# Patient Record
Sex: FEMALE | Race: WHITE | NOT HISPANIC OR LATINO | Employment: OTHER | URBAN - METROPOLITAN AREA
[De-identification: names, ages, dates, MRNs, and addresses within clinical notes are randomized per-mention and may not be internally consistent; named-entity substitution may affect disease eponyms.]

---

## 2017-02-02 ENCOUNTER — OFFICE VISIT - HEALTHEAST (OUTPATIENT)
Dept: ONCOLOGY | Facility: HOSPITAL | Age: 62
End: 2017-02-02

## 2017-02-02 ENCOUNTER — AMBULATORY - HEALTHEAST (OUTPATIENT)
Dept: INFUSION THERAPY | Facility: HOSPITAL | Age: 62
End: 2017-02-02

## 2017-02-02 ENCOUNTER — COMMUNICATION - HEALTHEAST (OUTPATIENT)
Dept: ONCOLOGY | Facility: HOSPITAL | Age: 62
End: 2017-02-02

## 2017-02-02 DIAGNOSIS — C50.411 MALIGNANT NEOPLASM OF UPPER-OUTER QUADRANT OF RIGHT FEMALE BREAST (H): ICD-10-CM

## 2017-02-02 DIAGNOSIS — M85.80 OSTEOPENIA: ICD-10-CM

## 2017-02-02 DIAGNOSIS — M25.50 ARTHRALGIA: ICD-10-CM

## 2017-02-02 DIAGNOSIS — R45.86 EMOTIONAL LABILITY: ICD-10-CM

## 2017-02-02 DIAGNOSIS — R53.83 FATIGUE: ICD-10-CM

## 2017-03-28 ENCOUNTER — COMMUNICATION - HEALTHEAST (OUTPATIENT)
Dept: ONCOLOGY | Facility: HOSPITAL | Age: 62
End: 2017-03-28

## 2017-05-25 ENCOUNTER — OFFICE VISIT - HEALTHEAST (OUTPATIENT)
Dept: ONCOLOGY | Facility: HOSPITAL | Age: 62
End: 2017-05-25

## 2017-05-25 ENCOUNTER — AMBULATORY - HEALTHEAST (OUTPATIENT)
Dept: INFUSION THERAPY | Facility: HOSPITAL | Age: 62
End: 2017-05-25

## 2017-05-25 DIAGNOSIS — C50.411 MALIGNANT NEOPLASM OF UPPER-OUTER QUADRANT OF RIGHT FEMALE BREAST (H): ICD-10-CM

## 2017-05-25 DIAGNOSIS — M85.80 OSTEOPENIA: ICD-10-CM

## 2017-06-29 ENCOUNTER — HOSPITAL ENCOUNTER (OUTPATIENT)
Dept: MAMMOGRAPHY | Facility: HOSPITAL | Age: 62
Discharge: HOME | End: 2017-06-29
Attending: OBSTETRICS & GYNECOLOGY

## 2017-06-29 DIAGNOSIS — Z12.31 VISIT FOR SCREENING MAMMOGRAM: ICD-10-CM

## 2017-09-29 ENCOUNTER — COMMUNICATION - HEALTHEAST (OUTPATIENT)
Dept: ONCOLOGY | Facility: HOSPITAL | Age: 62
End: 2017-09-29

## 2017-10-12 ENCOUNTER — COMMUNICATION - HEALTHEAST (OUTPATIENT)
Dept: ONCOLOGY | Facility: HOSPITAL | Age: 62
End: 2017-10-12

## 2017-11-27 ENCOUNTER — OFFICE VISIT - HEALTHEAST (OUTPATIENT)
Dept: ONCOLOGY | Facility: HOSPITAL | Age: 62
End: 2017-11-27

## 2017-11-27 ENCOUNTER — AMBULATORY - HEALTHEAST (OUTPATIENT)
Dept: INFUSION THERAPY | Facility: HOSPITAL | Age: 62
End: 2017-11-27

## 2017-11-27 DIAGNOSIS — Z17.0 MALIGNANT NEOPLASM OF UPPER-OUTER QUADRANT OF RIGHT BREAST IN FEMALE, ESTROGEN RECEPTOR POSITIVE (H): ICD-10-CM

## 2017-11-27 DIAGNOSIS — M85.80 OSTEOPENIA: ICD-10-CM

## 2017-11-27 DIAGNOSIS — M25.50 ARTHRALGIA: ICD-10-CM

## 2017-11-27 DIAGNOSIS — C50.411 MALIGNANT NEOPLASM OF UPPER-OUTER QUADRANT OF RIGHT BREAST IN FEMALE, ESTROGEN RECEPTOR POSITIVE (H): ICD-10-CM

## 2017-11-27 DIAGNOSIS — C50.411 MALIGNANT NEOPLASM OF UPPER-OUTER QUADRANT OF RIGHT FEMALE BREAST (H): ICD-10-CM

## 2018-02-22 ENCOUNTER — RECORDS - HEALTHEAST (OUTPATIENT)
Dept: BONE DENSITY | Facility: CLINIC | Age: 63
End: 2018-02-22

## 2018-02-22 DIAGNOSIS — M85.80 OTHER SPECIFIED DISORDERS OF BONE DENSITY AND STRUCTURE, UNSPECIFIED SITE: ICD-10-CM

## 2018-02-26 ENCOUNTER — RECORDS - HEALTHEAST (OUTPATIENT)
Dept: ADMINISTRATIVE | Facility: OTHER | Age: 63
End: 2018-02-26

## 2018-03-09 ENCOUNTER — COMMUNICATION - HEALTHEAST (OUTPATIENT)
Dept: ONCOLOGY | Facility: HOSPITAL | Age: 63
End: 2018-03-09

## 2018-05-17 ENCOUNTER — OFFICE VISIT - HEALTHEAST (OUTPATIENT)
Dept: ONCOLOGY | Facility: HOSPITAL | Age: 63
End: 2018-05-17

## 2018-05-17 ENCOUNTER — AMBULATORY - HEALTHEAST (OUTPATIENT)
Dept: INFUSION THERAPY | Facility: HOSPITAL | Age: 63
End: 2018-05-17

## 2018-05-17 DIAGNOSIS — C50.411 MALIGNANT NEOPLASM OF UPPER-OUTER QUADRANT OF RIGHT BREAST IN FEMALE, ESTROGEN RECEPTOR POSITIVE (H): ICD-10-CM

## 2018-05-17 DIAGNOSIS — Z17.0 MALIGNANT NEOPLASM OF UPPER-OUTER QUADRANT OF RIGHT BREAST IN FEMALE, ESTROGEN RECEPTOR POSITIVE (H): ICD-10-CM

## 2018-05-17 LAB
ALBUMIN SERPL-MCNC: 3.6 G/DL (ref 3.5–5)
ALP SERPL-CCNC: 66 U/L (ref 45–120)
ALT SERPL W P-5'-P-CCNC: 22 U/L (ref 0–45)
ANION GAP SERPL CALCULATED.3IONS-SCNC: 8 MMOL/L (ref 5–18)
AST SERPL W P-5'-P-CCNC: 19 U/L (ref 0–40)
BASOPHILS # BLD AUTO: 0 THOU/UL (ref 0–0.2)
BASOPHILS NFR BLD AUTO: 0 % (ref 0–2)
BILIRUB SERPL-MCNC: 0.6 MG/DL (ref 0–1)
BUN SERPL-MCNC: 23 MG/DL (ref 8–22)
CALCIUM SERPL-MCNC: 10 MG/DL (ref 8.5–10.5)
CHLORIDE BLD-SCNC: 101 MMOL/L (ref 98–107)
CO2 SERPL-SCNC: 28 MMOL/L (ref 22–31)
CREAT SERPL-MCNC: 0.86 MG/DL (ref 0.6–1.1)
EOSINOPHIL # BLD AUTO: 0.2 THOU/UL (ref 0–0.4)
EOSINOPHIL NFR BLD AUTO: 3 % (ref 0–6)
ERYTHROCYTE [DISTWIDTH] IN BLOOD BY AUTOMATED COUNT: 13 % (ref 11–14.5)
GFR SERPL CREATININE-BSD FRML MDRD: >60 ML/MIN/1.73M2
GLUCOSE BLD-MCNC: 113 MG/DL (ref 70–125)
HCT VFR BLD AUTO: 38.3 % (ref 35–47)
HGB BLD-MCNC: 12.7 G/DL (ref 12–16)
LYMPHOCYTES # BLD AUTO: 1.6 THOU/UL (ref 0.8–4.4)
LYMPHOCYTES NFR BLD AUTO: 26 % (ref 20–40)
MCH RBC QN AUTO: 29.6 PG (ref 27–34)
MCHC RBC AUTO-ENTMCNC: 33.2 G/DL (ref 32–36)
MCV RBC AUTO: 89 FL (ref 80–100)
MONOCYTES # BLD AUTO: 0.6 THOU/UL (ref 0–0.9)
MONOCYTES NFR BLD AUTO: 9 % (ref 2–10)
NEUTROPHILS # BLD AUTO: 3.7 THOU/UL (ref 2–7.7)
NEUTROPHILS NFR BLD AUTO: 62 % (ref 50–70)
PLATELET # BLD AUTO: 228 THOU/UL (ref 140–440)
PMV BLD AUTO: 12.3 FL (ref 8.5–12.5)
POTASSIUM BLD-SCNC: 4.2 MMOL/L (ref 3.5–5)
PROT SERPL-MCNC: 7.1 G/DL (ref 6–8)
RBC # BLD AUTO: 4.29 MILL/UL (ref 3.8–5.4)
SODIUM SERPL-SCNC: 137 MMOL/L (ref 136–145)
WBC: 6 THOU/UL (ref 4–11)

## 2018-06-30 ENCOUNTER — RECORDS - HEALTHEAST (OUTPATIENT)
Dept: ADMINISTRATIVE | Facility: OTHER | Age: 63
End: 2018-06-30

## 2018-08-01 ENCOUNTER — COMMUNICATION - HEALTHEAST (OUTPATIENT)
Dept: ONCOLOGY | Facility: CLINIC | Age: 63
End: 2018-08-01

## 2018-08-01 ENCOUNTER — HOSPITAL ENCOUNTER (OUTPATIENT)
Dept: MAMMOGRAPHY | Facility: CLINIC | Age: 63
Discharge: HOME | End: 2018-08-01
Attending: INTERNAL MEDICINE

## 2018-08-01 DIAGNOSIS — Z12.31 VISIT FOR SCREENING MAMMOGRAM: ICD-10-CM

## 2018-08-30 ENCOUNTER — RECORDS - HEALTHEAST (OUTPATIENT)
Dept: LAB | Facility: CLINIC | Age: 63
End: 2018-08-30

## 2018-08-30 LAB
CHOLEST SERPL-MCNC: 279 MG/DL
FASTING STATUS PATIENT QL REPORTED: YES
HDLC SERPL-MCNC: 83 MG/DL
LDLC SERPL CALC-MCNC: 183 MG/DL
TRIGL SERPL-MCNC: 64 MG/DL
VIT B12 SERPL-MCNC: 898 PG/ML (ref 213–816)

## 2018-11-09 ENCOUNTER — COMMUNICATION - HEALTHEAST (OUTPATIENT)
Dept: ONCOLOGY | Facility: HOSPITAL | Age: 63
End: 2018-11-09

## 2018-11-15 ENCOUNTER — OFFICE VISIT - HEALTHEAST (OUTPATIENT)
Dept: ONCOLOGY | Facility: HOSPITAL | Age: 63
End: 2018-11-15

## 2018-11-15 ENCOUNTER — AMBULATORY - HEALTHEAST (OUTPATIENT)
Dept: INFUSION THERAPY | Facility: HOSPITAL | Age: 63
End: 2018-11-15

## 2018-11-15 DIAGNOSIS — C50.411 MALIGNANT NEOPLASM OF UPPER-OUTER QUADRANT OF RIGHT BREAST IN FEMALE, ESTROGEN RECEPTOR POSITIVE (H): ICD-10-CM

## 2018-11-15 DIAGNOSIS — Z17.0 MALIGNANT NEOPLASM OF UPPER-OUTER QUADRANT OF RIGHT BREAST IN FEMALE, ESTROGEN RECEPTOR POSITIVE (H): ICD-10-CM

## 2018-11-15 LAB
ALBUMIN SERPL-MCNC: 3.7 G/DL (ref 3.5–5)
ALP SERPL-CCNC: 70 U/L (ref 45–120)
ALT SERPL W P-5'-P-CCNC: 27 U/L (ref 0–45)
ANION GAP SERPL CALCULATED.3IONS-SCNC: 6 MMOL/L (ref 5–18)
AST SERPL W P-5'-P-CCNC: 20 U/L (ref 0–40)
BASOPHILS # BLD AUTO: 0 THOU/UL (ref 0–0.2)
BASOPHILS NFR BLD AUTO: 0 % (ref 0–2)
BILIRUB SERPL-MCNC: 0.4 MG/DL (ref 0–1)
BUN SERPL-MCNC: 20 MG/DL (ref 8–22)
CALCIUM SERPL-MCNC: 9.9 MG/DL (ref 8.5–10.5)
CHLORIDE BLD-SCNC: 103 MMOL/L (ref 98–107)
CO2 SERPL-SCNC: 30 MMOL/L (ref 22–31)
CREAT SERPL-MCNC: 0.8 MG/DL (ref 0.6–1.1)
EOSINOPHIL # BLD AUTO: 0.2 THOU/UL (ref 0–0.4)
EOSINOPHIL NFR BLD AUTO: 3 % (ref 0–6)
ERYTHROCYTE [DISTWIDTH] IN BLOOD BY AUTOMATED COUNT: 13.2 % (ref 11–14.5)
GFR SERPL CREATININE-BSD FRML MDRD: >60 ML/MIN/1.73M2
GLUCOSE BLD-MCNC: 91 MG/DL (ref 70–125)
HCT VFR BLD AUTO: 38.4 % (ref 35–47)
HGB BLD-MCNC: 12.8 G/DL (ref 12–16)
LYMPHOCYTES # BLD AUTO: 1.5 THOU/UL (ref 0.8–4.4)
LYMPHOCYTES NFR BLD AUTO: 22 % (ref 20–40)
MCH RBC QN AUTO: 30.3 PG (ref 27–34)
MCHC RBC AUTO-ENTMCNC: 33.3 G/DL (ref 32–36)
MCV RBC AUTO: 91 FL (ref 80–100)
MONOCYTES # BLD AUTO: 0.6 THOU/UL (ref 0–0.9)
MONOCYTES NFR BLD AUTO: 9 % (ref 2–10)
NEUTROPHILS # BLD AUTO: 4.6 THOU/UL (ref 2–7.7)
NEUTROPHILS NFR BLD AUTO: 66 % (ref 50–70)
PLATELET # BLD AUTO: 234 THOU/UL (ref 140–440)
PMV BLD AUTO: 12.1 FL (ref 8.5–12.5)
POTASSIUM BLD-SCNC: 4.4 MMOL/L (ref 3.5–5)
PROT SERPL-MCNC: 6.9 G/DL (ref 6–8)
RBC # BLD AUTO: 4.23 MILL/UL (ref 3.8–5.4)
SODIUM SERPL-SCNC: 139 MMOL/L (ref 136–145)
WBC: 7 THOU/UL (ref 4–11)

## 2018-12-12 ENCOUNTER — COMMUNICATION - HEALTHEAST (OUTPATIENT)
Dept: ONCOLOGY | Facility: HOSPITAL | Age: 63
End: 2018-12-12

## 2019-05-16 ENCOUNTER — OFFICE VISIT - HEALTHEAST (OUTPATIENT)
Dept: ONCOLOGY | Facility: HOSPITAL | Age: 64
End: 2019-05-16

## 2019-05-16 ENCOUNTER — AMBULATORY - HEALTHEAST (OUTPATIENT)
Dept: INFUSION THERAPY | Facility: HOSPITAL | Age: 64
End: 2019-05-16

## 2019-05-16 DIAGNOSIS — C50.411 MALIGNANT NEOPLASM OF UPPER-OUTER QUADRANT OF RIGHT BREAST IN FEMALE, ESTROGEN RECEPTOR POSITIVE (H): ICD-10-CM

## 2019-05-16 DIAGNOSIS — Z17.0 MALIGNANT NEOPLASM OF UPPER-OUTER QUADRANT OF RIGHT BREAST IN FEMALE, ESTROGEN RECEPTOR POSITIVE (H): ICD-10-CM

## 2019-05-16 LAB
ALBUMIN SERPL-MCNC: 3.9 G/DL (ref 3.5–5)
ALP SERPL-CCNC: 76 U/L (ref 45–120)
ALT SERPL W P-5'-P-CCNC: 19 U/L (ref 0–45)
ANION GAP SERPL CALCULATED.3IONS-SCNC: 7 MMOL/L (ref 5–18)
AST SERPL W P-5'-P-CCNC: 19 U/L (ref 0–40)
BASOPHILS # BLD AUTO: 0 THOU/UL (ref 0–0.2)
BASOPHILS NFR BLD AUTO: 0 % (ref 0–2)
BILIRUB SERPL-MCNC: 0.4 MG/DL (ref 0–1)
BUN SERPL-MCNC: 21 MG/DL (ref 8–22)
CALCIUM SERPL-MCNC: 10.1 MG/DL (ref 8.5–10.5)
CHLORIDE BLD-SCNC: 102 MMOL/L (ref 98–107)
CO2 SERPL-SCNC: 28 MMOL/L (ref 22–31)
CREAT SERPL-MCNC: 0.93 MG/DL (ref 0.6–1.1)
EOSINOPHIL # BLD AUTO: 0.1 THOU/UL (ref 0–0.4)
EOSINOPHIL NFR BLD AUTO: 2 % (ref 0–6)
ERYTHROCYTE [DISTWIDTH] IN BLOOD BY AUTOMATED COUNT: 13 % (ref 11–14.5)
GFR SERPL CREATININE-BSD FRML MDRD: >60 ML/MIN/1.73M2
GLUCOSE BLD-MCNC: 95 MG/DL (ref 70–125)
HCT VFR BLD AUTO: 39.8 % (ref 35–47)
HGB BLD-MCNC: 13.3 G/DL (ref 12–16)
LYMPHOCYTES # BLD AUTO: 1.2 THOU/UL (ref 0.8–4.4)
LYMPHOCYTES NFR BLD AUTO: 18 % (ref 20–40)
MCH RBC QN AUTO: 30.2 PG (ref 27–34)
MCHC RBC AUTO-ENTMCNC: 33.4 G/DL (ref 32–36)
MCV RBC AUTO: 90 FL (ref 80–100)
MONOCYTES # BLD AUTO: 0.6 THOU/UL (ref 0–0.9)
MONOCYTES NFR BLD AUTO: 9 % (ref 2–10)
NEUTROPHILS # BLD AUTO: 4.9 THOU/UL (ref 2–7.7)
NEUTROPHILS NFR BLD AUTO: 71 % (ref 50–70)
PLATELET # BLD AUTO: 234 THOU/UL (ref 140–440)
PMV BLD AUTO: 11.9 FL (ref 8.5–12.5)
POTASSIUM BLD-SCNC: 4.2 MMOL/L (ref 3.5–5)
PROT SERPL-MCNC: 7.3 G/DL (ref 6–8)
RBC # BLD AUTO: 4.41 MILL/UL (ref 3.8–5.4)
SODIUM SERPL-SCNC: 137 MMOL/L (ref 136–145)
WBC: 6.9 THOU/UL (ref 4–11)

## 2019-05-22 ENCOUNTER — RECORDS - HEALTHEAST (OUTPATIENT)
Dept: ADMINISTRATIVE | Facility: OTHER | Age: 64
End: 2019-05-22

## 2019-05-23 ENCOUNTER — RECORDS - HEALTHEAST (OUTPATIENT)
Dept: ADMINISTRATIVE | Facility: OTHER | Age: 64
End: 2019-05-23

## 2019-05-28 ENCOUNTER — RECORDS - HEALTHEAST (OUTPATIENT)
Dept: ADMINISTRATIVE | Facility: OTHER | Age: 64
End: 2019-05-28

## 2019-05-29 ENCOUNTER — HOSPITAL ENCOUNTER (OUTPATIENT)
Dept: CARDIOLOGY | Facility: HOSPITAL | Age: 64
Discharge: HOME | End: 2019-05-29
Attending: FAMILY MEDICINE

## 2019-05-29 DIAGNOSIS — R07.9 CHEST PAIN, UNSPECIFIED TYPE: ICD-10-CM

## 2019-05-29 LAB
CV STRESS CURRENT BP HE: NORMAL
CV STRESS CURRENT HR HE: 112
CV STRESS CURRENT HR HE: 115
CV STRESS CURRENT HR HE: 122
CV STRESS CURRENT HR HE: 123
CV STRESS CURRENT HR HE: 133
CV STRESS CURRENT HR HE: 134
CV STRESS CURRENT HR HE: 143
CV STRESS CURRENT HR HE: 146
CV STRESS CURRENT HR HE: 148
CV STRESS CURRENT HR HE: 154
CV STRESS CURRENT HR HE: 156
CV STRESS CURRENT HR HE: 165
CV STRESS CURRENT HR HE: 166
CV STRESS CURRENT HR HE: 77
CV STRESS CURRENT HR HE: 78
CV STRESS CURRENT HR HE: 79
CV STRESS CURRENT HR HE: 80
CV STRESS CURRENT HR HE: 80
CV STRESS CURRENT HR HE: 82
CV STRESS CURRENT HR HE: 84
CV STRESS CURRENT HR HE: 85
CV STRESS CURRENT HR HE: 88
CV STRESS CURRENT HR HE: 89
CV STRESS CURRENT HR HE: 89
CV STRESS DEVIATION TIME HE: NORMAL
CV STRESS ECHO PERCENT HR HE: NORMAL
CV STRESS EXERCISE STAGE HE: NORMAL
CV STRESS FINAL RESTING BP HE: NORMAL
CV STRESS FINAL RESTING HR HE: 82
CV STRESS MAX HR HE: 166
CV STRESS MAX TREADMILL GRADE HE: 12
CV STRESS MAX TREADMILL SPEED HE: 2.5
CV STRESS PEAK DIA BP HE: NORMAL
CV STRESS PEAK SYS BP HE: NORMAL
CV STRESS PHASE HE: NORMAL
CV STRESS PROTOCOL HE: NORMAL
CV STRESS RESTING PT POSITION HE: NORMAL
CV STRESS RESTING PT POSITION HE: NORMAL
CV STRESS ST DEVIATION AMOUNT HE: NORMAL
CV STRESS ST DEVIATION ELEVATION HE: NORMAL
CV STRESS ST EVELATION AMOUNT HE: NORMAL
CV STRESS TEST TYPE HE: NORMAL
CV STRESS TOTAL STAGE TIME MIN 1 HE: NORMAL
STRESS ECHO BASELINE BP: NORMAL
STRESS ECHO BASELINE HR: 85
STRESS ECHO CALCULATED PERCENT HR: 107 %
STRESS ECHO LAST STRESS BP: NORMAL
STRESS ECHO LAST STRESS HR: 165
STRESS ECHO POST ESTIMATED WORKLOAD: 7.1
STRESS ECHO POST EXERCISE DUR MIN: 5
STRESS ECHO POST EXERCISE DUR SEC: 55
STRESS ECHO TARGET HR: 132

## 2019-09-04 ENCOUNTER — COMMUNICATION - HEALTHEAST (OUTPATIENT)
Dept: ONCOLOGY | Facility: HOSPITAL | Age: 64
End: 2019-09-04

## 2019-09-04 DIAGNOSIS — C50.411 MALIGNANT NEOPLASM OF UPPER-OUTER QUADRANT OF RIGHT BREAST IN FEMALE, ESTROGEN RECEPTOR POSITIVE (H): ICD-10-CM

## 2019-09-04 DIAGNOSIS — Z17.0 MALIGNANT NEOPLASM OF UPPER-OUTER QUADRANT OF RIGHT BREAST IN FEMALE, ESTROGEN RECEPTOR POSITIVE (H): ICD-10-CM

## 2019-09-24 ENCOUNTER — HOSPITAL ENCOUNTER (OUTPATIENT)
Dept: MAMMOGRAPHY | Facility: CLINIC | Age: 64
Discharge: HOME | End: 2019-09-24
Attending: INTERNAL MEDICINE

## 2019-09-24 DIAGNOSIS — Z12.31 VISIT FOR SCREENING MAMMOGRAM: ICD-10-CM

## 2020-04-20 ENCOUNTER — COMMUNICATION - HEALTHEAST (OUTPATIENT)
Dept: ONCOLOGY | Facility: HOSPITAL | Age: 65
End: 2020-04-20

## 2020-04-20 DIAGNOSIS — C50.411 MALIGNANT NEOPLASM OF UPPER-OUTER QUADRANT OF RIGHT BREAST IN FEMALE, ESTROGEN RECEPTOR POSITIVE (H): ICD-10-CM

## 2020-04-20 DIAGNOSIS — Z17.0 MALIGNANT NEOPLASM OF UPPER-OUTER QUADRANT OF RIGHT BREAST IN FEMALE, ESTROGEN RECEPTOR POSITIVE (H): ICD-10-CM

## 2020-04-21 ENCOUNTER — COMMUNICATION - HEALTHEAST (OUTPATIENT)
Dept: ONCOLOGY | Facility: HOSPITAL | Age: 65
End: 2020-04-21

## 2020-05-11 ENCOUNTER — COMMUNICATION - HEALTHEAST (OUTPATIENT)
Dept: ONCOLOGY | Facility: HOSPITAL | Age: 65
End: 2020-05-11

## 2020-05-11 ENCOUNTER — RECORDS - HEALTHEAST (OUTPATIENT)
Dept: ADMINISTRATIVE | Facility: OTHER | Age: 65
End: 2020-05-11

## 2020-05-13 ENCOUNTER — OFFICE VISIT - HEALTHEAST (OUTPATIENT)
Dept: ONCOLOGY | Facility: HOSPITAL | Age: 65
End: 2020-05-13

## 2020-05-13 DIAGNOSIS — C50.411 MALIGNANT NEOPLASM OF UPPER-OUTER QUADRANT OF RIGHT BREAST IN FEMALE, ESTROGEN RECEPTOR POSITIVE (H): ICD-10-CM

## 2020-05-13 DIAGNOSIS — Z17.0 MALIGNANT NEOPLASM OF UPPER-OUTER QUADRANT OF RIGHT BREAST IN FEMALE, ESTROGEN RECEPTOR POSITIVE (H): ICD-10-CM

## 2020-05-13 DIAGNOSIS — M85.80 OSTEOPENIA, UNSPECIFIED LOCATION: ICD-10-CM

## 2020-07-16 ENCOUNTER — OFFICE VISIT - HEALTHEAST (OUTPATIENT)
Dept: ONCOLOGY | Facility: HOSPITAL | Age: 65
End: 2020-07-16

## 2020-07-16 DIAGNOSIS — C50.411 MALIGNANT NEOPLASM OF UPPER-OUTER QUADRANT OF RIGHT BREAST IN FEMALE, ESTROGEN RECEPTOR POSITIVE (H): ICD-10-CM

## 2020-07-16 DIAGNOSIS — Z17.0 MALIGNANT NEOPLASM OF UPPER-OUTER QUADRANT OF RIGHT BREAST IN FEMALE, ESTROGEN RECEPTOR POSITIVE (H): ICD-10-CM

## 2020-08-05 ENCOUNTER — RECORDS - HEALTHEAST (OUTPATIENT)
Dept: LAB | Facility: CLINIC | Age: 65
End: 2020-08-05

## 2020-08-05 LAB
ALBUMIN SERPL-MCNC: 4 G/DL (ref 3.5–5)
ALP SERPL-CCNC: 75 U/L (ref 45–120)
ALT SERPL W P-5'-P-CCNC: 20 U/L (ref 0–45)
ANION GAP SERPL CALCULATED.3IONS-SCNC: 11 MMOL/L (ref 5–18)
AST SERPL W P-5'-P-CCNC: 19 U/L (ref 0–40)
BILIRUB SERPL-MCNC: 0.7 MG/DL (ref 0–1)
BUN SERPL-MCNC: 16 MG/DL (ref 8–22)
CALCIUM SERPL-MCNC: 10.3 MG/DL (ref 8.5–10.5)
CHLORIDE BLD-SCNC: 103 MMOL/L (ref 98–107)
CHOLEST SERPL-MCNC: 279 MG/DL
CO2 SERPL-SCNC: 24 MMOL/L (ref 22–31)
CREAT SERPL-MCNC: 0.8 MG/DL (ref 0.6–1.1)
FASTING STATUS PATIENT QL REPORTED: YES
GFR SERPL CREATININE-BSD FRML MDRD: >60 ML/MIN/1.73M2
GLUCOSE BLD-MCNC: 95 MG/DL (ref 70–125)
HDLC SERPL-MCNC: 70 MG/DL
LDLC SERPL CALC-MCNC: 195 MG/DL
MAGNESIUM SERPL-MCNC: 1.9 MG/DL (ref 1.8–2.6)
POTASSIUM BLD-SCNC: 4.5 MMOL/L (ref 3.5–5)
PROT SERPL-MCNC: 6.9 G/DL (ref 6–8)
SODIUM SERPL-SCNC: 138 MMOL/L (ref 136–145)
TRIGL SERPL-MCNC: 70 MG/DL
VIT B12 SERPL-MCNC: 967 PG/ML (ref 213–816)

## 2020-08-06 LAB — 25(OH)D3 SERPL-MCNC: 60.9 NG/ML (ref 30–80)

## 2020-08-09 ENCOUNTER — COMMUNICATION - HEALTHEAST (OUTPATIENT)
Dept: ONCOLOGY | Facility: HOSPITAL | Age: 65
End: 2020-08-09

## 2020-08-10 ENCOUNTER — AMBULATORY - HEALTHEAST (OUTPATIENT)
Dept: ONCOLOGY | Facility: HOSPITAL | Age: 65
End: 2020-08-10

## 2020-08-10 DIAGNOSIS — C50.411 MALIGNANT NEOPLASM OF UPPER-OUTER QUADRANT OF RIGHT BREAST IN FEMALE, ESTROGEN RECEPTOR POSITIVE (H): ICD-10-CM

## 2020-08-10 DIAGNOSIS — R93.89 ABNORMAL ULTRASOUND OF NECK: ICD-10-CM

## 2020-08-10 DIAGNOSIS — Z17.0 MALIGNANT NEOPLASM OF UPPER-OUTER QUADRANT OF RIGHT BREAST IN FEMALE, ESTROGEN RECEPTOR POSITIVE (H): ICD-10-CM

## 2020-08-11 ENCOUNTER — COMMUNICATION - HEALTHEAST (OUTPATIENT)
Dept: ONCOLOGY | Facility: HOSPITAL | Age: 65
End: 2020-08-11

## 2020-10-01 ENCOUNTER — COMMUNICATION - HEALTHEAST (OUTPATIENT)
Dept: ONCOLOGY | Facility: HOSPITAL | Age: 65
End: 2020-10-01

## 2020-10-06 ENCOUNTER — HOSPITAL ENCOUNTER (OUTPATIENT)
Dept: ULTRASOUND IMAGING | Facility: HOSPITAL | Age: 65
Setting detail: RADIATION/ONCOLOGY SERIES
Discharge: STILL ADMITTED OR ADMINISTRATIVE | End: 2020-10-06
Attending: INTERNAL MEDICINE

## 2020-10-06 DIAGNOSIS — Z17.0 MALIGNANT NEOPLASM OF UPPER-OUTER QUADRANT OF RIGHT BREAST IN FEMALE, ESTROGEN RECEPTOR POSITIVE (H): ICD-10-CM

## 2020-10-06 DIAGNOSIS — R93.89 ABNORMAL ULTRASOUND OF NECK: ICD-10-CM

## 2020-10-06 DIAGNOSIS — C50.411 MALIGNANT NEOPLASM OF UPPER-OUTER QUADRANT OF RIGHT BREAST IN FEMALE, ESTROGEN RECEPTOR POSITIVE (H): ICD-10-CM

## 2020-10-08 ENCOUNTER — OFFICE VISIT - HEALTHEAST (OUTPATIENT)
Dept: ONCOLOGY | Facility: HOSPITAL | Age: 65
End: 2020-10-08

## 2020-10-08 DIAGNOSIS — C50.411 MALIGNANT NEOPLASM OF UPPER-OUTER QUADRANT OF RIGHT BREAST IN FEMALE, ESTROGEN RECEPTOR POSITIVE (H): ICD-10-CM

## 2020-10-08 DIAGNOSIS — Z17.0 MALIGNANT NEOPLASM OF UPPER-OUTER QUADRANT OF RIGHT BREAST IN FEMALE, ESTROGEN RECEPTOR POSITIVE (H): ICD-10-CM

## 2020-11-06 ENCOUNTER — HOSPITAL ENCOUNTER (OUTPATIENT)
Dept: MAMMOGRAPHY | Facility: CLINIC | Age: 65
Discharge: HOME | End: 2020-11-06
Attending: INTERNAL MEDICINE

## 2020-11-06 DIAGNOSIS — Z12.31 VISIT FOR SCREENING MAMMOGRAM: ICD-10-CM

## 2020-11-19 ENCOUNTER — RECORDS - HEALTHEAST (OUTPATIENT)
Dept: LAB | Facility: CLINIC | Age: 65
End: 2020-11-19

## 2020-11-19 LAB
ANION GAP SERPL CALCULATED.3IONS-SCNC: 7 MMOL/L (ref 5–18)
BUN SERPL-MCNC: 21 MG/DL (ref 8–22)
CALCIUM SERPL-MCNC: 9.9 MG/DL (ref 8.5–10.5)
CHLORIDE BLD-SCNC: 103 MMOL/L (ref 98–107)
CO2 SERPL-SCNC: 28 MMOL/L (ref 22–31)
CREAT SERPL-MCNC: 0.8 MG/DL (ref 0.6–1.1)
GFR SERPL CREATININE-BSD FRML MDRD: >60 ML/MIN/1.73M2
GLUCOSE BLD-MCNC: 94 MG/DL (ref 70–125)
POTASSIUM BLD-SCNC: 5.1 MMOL/L (ref 3.5–5)
SODIUM SERPL-SCNC: 138 MMOL/L (ref 136–145)
TSH SERPL DL<=0.005 MIU/L-ACNC: 1.46 UIU/ML (ref 0.3–5)

## 2021-04-17 ENCOUNTER — COMMUNICATION - HEALTHEAST (OUTPATIENT)
Dept: ONCOLOGY | Facility: HOSPITAL | Age: 66
End: 2021-04-17

## 2021-04-17 DIAGNOSIS — C50.411 MALIGNANT NEOPLASM OF UPPER-OUTER QUADRANT OF RIGHT BREAST IN FEMALE, ESTROGEN RECEPTOR POSITIVE (H): ICD-10-CM

## 2021-04-17 DIAGNOSIS — Z17.0 MALIGNANT NEOPLASM OF UPPER-OUTER QUADRANT OF RIGHT BREAST IN FEMALE, ESTROGEN RECEPTOR POSITIVE (H): ICD-10-CM

## 2021-04-19 RX ORDER — ANASTROZOLE 1 MG/1
TABLET ORAL
Qty: 90 TABLET | Refills: 0 | Status: SHIPPED | OUTPATIENT
Start: 2021-04-19 | End: 2022-06-30

## 2021-04-27 ENCOUNTER — COMMUNICATION - HEALTHEAST (OUTPATIENT)
Dept: ONCOLOGY | Facility: HOSPITAL | Age: 66
End: 2021-04-27

## 2021-04-28 ENCOUNTER — OFFICE VISIT - HEALTHEAST (OUTPATIENT)
Dept: ONCOLOGY | Facility: HOSPITAL | Age: 66
End: 2021-04-28

## 2021-04-28 DIAGNOSIS — C50.411 MALIGNANT NEOPLASM OF UPPER-OUTER QUADRANT OF RIGHT BREAST IN FEMALE, ESTROGEN RECEPTOR POSITIVE (H): ICD-10-CM

## 2021-04-28 DIAGNOSIS — Z17.0 MALIGNANT NEOPLASM OF UPPER-OUTER QUADRANT OF RIGHT BREAST IN FEMALE, ESTROGEN RECEPTOR POSITIVE (H): ICD-10-CM

## 2021-04-28 DIAGNOSIS — N60.11 MASTITIS CHRONIC, RIGHT: ICD-10-CM

## 2021-04-28 DIAGNOSIS — M85.80 OSTEOPENIA, UNSPECIFIED LOCATION: ICD-10-CM

## 2021-04-28 ASSESSMENT — MIFFLIN-ST. JEOR: SCORE: 1565.54

## 2021-05-25 ENCOUNTER — RECORDS - HEALTHEAST (OUTPATIENT)
Dept: ADMINISTRATIVE | Facility: CLINIC | Age: 66
End: 2021-05-25

## 2021-05-26 ENCOUNTER — RECORDS - HEALTHEAST (OUTPATIENT)
Dept: ADMINISTRATIVE | Facility: CLINIC | Age: 66
End: 2021-05-26

## 2021-05-27 ENCOUNTER — RECORDS - HEALTHEAST (OUTPATIENT)
Dept: ADMINISTRATIVE | Facility: CLINIC | Age: 66
End: 2021-05-27

## 2021-05-28 ENCOUNTER — RECORDS - HEALTHEAST (OUTPATIENT)
Dept: ADMINISTRATIVE | Facility: CLINIC | Age: 66
End: 2021-05-28

## 2021-05-28 NOTE — PROGRESS NOTES
[facility] Cancer Care Progress Note    Patient: Dionna Estrada  MRN: [id number]  Date of Service: 5/16/2019        Reason for visit      1. Malignant neoplasm of upper-outer quadrant of right breast in female, estrogen receptor positive (H)        Assessment     1.  Stage IIc of breast right side HER-2 positive ER positive.  Status post lumpectomy with 6 sentinel lymph nodes removed.  She has been treated with TCHP. She has had 6 cycles. Tolerated with expected side effects.  She is also finished radiation therapy and Herceptin maintenance.  Started on exemestane in February 2016.  Switched to anastrozole in March 2017. She is tolerating it well.  No evidence of recurrence.  2.  Mild osteopenia seen on Bone density stable.  3.  Tolerating the treatment well doing well overall.    Plan     1.  Continue Arimidex.  2.  Good diet and exercise.  3.  RTC in 12 months.   4.  Mammogram yearly in August.     Clinical stage      Cancer of upper-outer quadrant of female breast, right    Primary site: Breast (Right)    Staging method: AJCC 7th Edition    Clinical free text: ER+,VT-, Rzw7fdm 3+,    Clinical: Stage IIA (T2, N0, cM0) signed by Gabriel Vega MD on 7/23/2015  4:52 PM    Summary: Stage IIA (T2, N0, cM0)      History     Dionna Estrada is a very pleasant 64 y.o. old female with a history of  breast cancer located right side measuring 3.1 cm in size presenting as a lump that she detected in June 2015.  The biopsy confirmed this to be an invasive ductal carcinoma ER positive, VT negative and HER-2/abhijit 3+.  Subsequent that she underwent surgery on 9 July 2015 and she underwent right lumpectomy with sentinel lymph node biopsy.  That confirmed a 3.1 cm tumor with negative margin.  There was some DCIS present.  She had 3 sentinel lymph node removed and there were 6 total lymph nodes that were removed and all of them were negative.  Final stage T2 N0 M0 stage II.    She has started on TCHP protocol in  "August 2015 finished in December 2015. Tolerated it well. Some GERD and some diarrhea.  She also finished radiation therapy in February 2016.  Total dose of 6040 cGy delivered in 33 fractions.  Currently on Herceptin Maintenance.  Comes in today for her last treatment.     Her main complaint was that she feels very \"crabby\" being on Exemestane.  So in February 2017 her treatment was changed to anastrozole 1 mg p.o. daily.  She has been tolerating that quite well.  Her muscular skeletal symptoms are significantly better.  She is taking some supplements for that.     We have monitored her bone density and that has been stable. Comes in today for scheduled follow up.    Past Medical History     Past Medical History:   Diagnosis Date     Breast cancer (H) 2015    right      Clotting disorder (H)     lower leg     Hx antineoplastic chemotherapy      Hx of radiation therapy      Shingles        Review of Systems   Constitutional  Constitutional (WDL): All constitutional elements are within defined limits  Neurosensory  Neurosensory (WDL): All neurosensory elements are within defined limits  Cardiovascular  Cardiovascular (WDL): All cardiovascular elements are within defined limits  Pulmonary  Respiratory (WDL): Within Defined Limits  Gastrointestinal  Gastrointestinal (WDL): All gastrointestinal elements are within defined limits  Genitourinary  Genitourinary (WDL): All genitourinary elements are within defined limits  Integumentary  Integumentary (WDL): All integumentary elements are within defined limits  Patient Coping  Patient Coping: Accepting  Accompanied by  Accompanied by: Alone    ECOG performance status and Distress Assessment      ECOG Performance:    ECOG Performance Status: 0    Distress Assessment  Distress Assessment Score: No distress:     Pain Status  Currently in Pain: No/denies    Vital Signs     Vitals:    05/16/19 1526   BP: 142/87   Pulse: 76   Temp: 98  F (36.7  C)   SpO2: 98%       Physical Exam "     GENERAL: No acute distress. Cooperative in conversation.   HEENT: Alopecia. Pupils are equal, round and reactive. Oral mucosa is clean and intact. No ulcerations or mucositis noted. No bleeding noted.  RESP:Chest symmetric lungs are clear bilaterally per auscultation. Regular respiratory rate. No wheezes or rhonchi.  CV: Normal S1 S2 Regular, rate and rhythm. No murmurs.  ABD: Nondistended, soft, nontender. Positive bowel sounds. No organomegaly.   EXTREMITIES: No lower extremity edema.   NEURO: Non- focal. Alert and oriented x3.  Cranial nerves appear intact.  PSYCH: Within normal limits. No depression or anxiety.  SKIN: Warm dry intact.    LYMPH NODES: Bilateral cervical, supraclavicular, axillary lymph node examination was done.  Negative for any palpable adenopathy.    Lab Results     Results for orders placed or performed in visit on 05/16/19   Comprehensive Metabolic Panel   Result Value Ref Range    Sodium 137 136 - 145 mmol/L    Potassium 4.2 3.5 - 5.0 mmol/L    Chloride 102 98 - 107 mmol/L    CO2 28 22 - 31 mmol/L    Anion Gap, Calculation 7 5 - 18 mmol/L    Glucose 95 70 - 125 mg/dL    BUN 21 8 - 22 mg/dL    Creatinine 0.93 0.60 - 1.10 mg/dL    GFR MDRD Af Amer >60 >60 mL/min/1.73m2    GFR MDRD Non Af Amer >60 >60 mL/min/1.73m2    Bilirubin, Total 0.4 0.0 - 1.0 mg/dL    Calcium 10.1 8.5 - 10.5 mg/dL    Protein, Total 7.3 6.0 - 8.0 g/dL    Albumin 3.9 3.5 - 5.0 g/dL    Alkaline Phosphatase 76 45 - 120 U/L    AST 19 0 - 40 U/L    ALT 19 0 - 45 U/L   HM1 (CBC with Diff)   Result Value Ref Range    WBC 6.9 4.0 - 11.0 thou/uL    RBC 4.41 3.80 - 5.40 mill/uL    Hemoglobin 13.3 12.0 - 16.0 g/dL    Hematocrit 39.8 35.0 - 47.0 %    MCV 90 80 - 100 fL    MCH 30.2 27.0 - 34.0 pg    MCHC 33.4 32.0 - 36.0 g/dL    RDW 13.0 11.0 - 14.5 %    Platelets 234 140 - 440 thou/uL    MPV 11.9 8.5 - 12.5 fL    Neutrophils % 71 (H) 50 - 70 %    Lymphocytes % 18 (L) 20 - 40 %    Monocytes % 9 2 - 10 %    Eosinophils % 2 0 - 6 %     Basophils % 0 0 - 2 %    Neutrophils Absolute 4.9 2.0 - 7.7 thou/uL    Lymphocytes Absolute 1.2 0.8 - 4.4 thou/uL    Monocytes Absolute 0.6 0.0 - 0.9 thou/uL    Eosinophils Absolute 0.1 0.0 - 0.4 thou/uL    Basophils Absolute 0.0 0.0 - 0.2 thou/uL         Imaging Results     No results found.    Mammogram done in August 2018.  No evidence of any recurrence.    Gabriel Vega MD

## 2021-05-29 ENCOUNTER — RECORDS - HEALTHEAST (OUTPATIENT)
Dept: ADMINISTRATIVE | Facility: CLINIC | Age: 66
End: 2021-05-29

## 2021-05-30 ENCOUNTER — RECORDS - HEALTHEAST (OUTPATIENT)
Dept: ADMINISTRATIVE | Facility: CLINIC | Age: 66
End: 2021-05-30

## 2021-05-30 VITALS — WEIGHT: 230.1 LBS | BODY MASS INDEX: 37.14 KG/M2

## 2021-05-31 VITALS — BODY MASS INDEX: 36.93 KG/M2 | WEIGHT: 228.8 LBS

## 2021-05-31 VITALS — BODY MASS INDEX: 36.64 KG/M2 | WEIGHT: 227 LBS

## 2021-06-01 VITALS — BODY MASS INDEX: 36.85 KG/M2 | WEIGHT: 228.3 LBS

## 2021-06-02 VITALS — WEIGHT: 228.3 LBS | BODY MASS INDEX: 36.85 KG/M2

## 2021-06-03 VITALS — BODY MASS INDEX: 37.33 KG/M2 | WEIGHT: 231.3 LBS

## 2021-06-04 VITALS
WEIGHT: 223.4 LBS | DIASTOLIC BLOOD PRESSURE: 82 MMHG | SYSTOLIC BLOOD PRESSURE: 133 MMHG | OXYGEN SATURATION: 96 % | BODY MASS INDEX: 36.06 KG/M2 | HEART RATE: 75 BPM | TEMPERATURE: 98.4 F

## 2021-06-05 VITALS
WEIGHT: 223.5 LBS | HEART RATE: 72 BPM | OXYGEN SATURATION: 96 % | BODY MASS INDEX: 35.92 KG/M2 | HEIGHT: 66 IN | DIASTOLIC BLOOD PRESSURE: 74 MMHG | SYSTOLIC BLOOD PRESSURE: 154 MMHG

## 2021-06-05 VITALS
BODY MASS INDEX: 35.64 KG/M2 | HEART RATE: 87 BPM | SYSTOLIC BLOOD PRESSURE: 130 MMHG | OXYGEN SATURATION: 97 % | TEMPERATURE: 98.3 F | DIASTOLIC BLOOD PRESSURE: 81 MMHG | WEIGHT: 220.8 LBS

## 2021-06-08 NOTE — PROGRESS NOTES
[facility] Cancer Care Progress Note    Patient: Dionna Estrada  MRN: [id number]  Date of Service: 5/13/2020        Reason for visit      1. Malignant neoplasm of upper-outer quadrant of right breast in female, estrogen receptor positive (H)        Assessment     1.  Stage IIc of breast right side HER-2 positive ER positive.  Status post lumpectomy in 2015 with 6 sentinel lymph nodes removed.  She has been treated with TCHP. She has had 6 cycles. Tolerated with expected side effects.  She is also finished radiation therapy and Herceptin maintenance.  Started on exemestane in February 2016.  Switched to anastrozole in March 2017. She is tolerating it well.  No evidence of recurrence.  2.  Mild osteopenia seen on Bone density stable.  3.  Tolerating the treatment well doing well overall.    Plan     1.  Continue Arimidex.  She will continue that until February 2021.  2.  Good diet and exercise.  3.  RTC in 12 months.   4.  Mammogram yearly .  She will need a bone density next year.     Clinical stage      Cancer of upper-outer quadrant of female breast, right    Primary site: Breast (Right)    Staging method: AJCC 7th Edition    Clinical free text: ER+,MA-, Doz7yak 3+,    Clinical: Stage IIA (T2, N0, cM0) signed by Gabriel Vega MD on 7/23/2015  4:52 PM    Summary: Stage IIA (T2, N0, cM0)      History     Dionna Estrada is a very pleasant 65 y.o. old female with a history of  breast cancer located right side measuring 3.1 cm in size presenting as a lump that she detected in June 2015.  The biopsy confirmed this to be an invasive ductal carcinoma ER positive, MA negative and HER-2/abhijit 3+.  Subsequent that she underwent surgery on 9 July 2015 and she underwent right lumpectomy with sentinel lymph node biopsy.  That confirmed a 3.1 cm tumor with negative margin.  There was some DCIS present.  She had 3 sentinel lymph node removed and there were 6 total lymph nodes that were removed and all of them  "were negative.  Final stage T2 N0 M0 stage II.    She has started on TCHP protocol in August 2015 finished in December 2015. Tolerated it well. Some GERD and some diarrhea.  She also finished radiation therapy in February 2016.  Total dose of 6040 cGy delivered in 33 fractions.  Finished Herceptin Maintenance.  She was started on exemestane.    Her main complaint was that she feels very \"crabby\" being on Exemestane.  So in February 2017 her treatment was changed to anastrozole 1 mg p.o. daily.  She has been tolerating that quite well.  Her muscular skeletal symptoms are significantly better.  She is taking some supplements for that.     We have monitored her bone density and that has been stable. Comes in today for scheduled follow up.  She is retired from her main job.  Overall doing fine.  Somewhat worried about the coronavirus.    Past Medical History     Past Medical History:   Diagnosis Date     Breast cancer (H) 2015    right      Clotting disorder (H)     lower leg     Hx antineoplastic chemotherapy 2015     Hx of radiation therapy 2015     Shingles        Review of Systems   Constitutional  Constitutional (WDL): Exceptions to WDL  Weight Loss: to <10% from baseline, intervention not indicated(down 8 lbs)  Neurosensory  Neurosensory (WDL): All neurosensory elements are within defined limits  Cardiovascular  Cardiovascular (WDL): All cardiovascular elements are within defined limits  Pulmonary  Respiratory (WDL): Within Defined Limits  Gastrointestinal  Gastrointestinal (WDL): All gastrointestinal elements are within defined limits  Genitourinary  Genitourinary (WDL): All genitourinary elements are within defined limits  Integumentary  Integumentary (WDL): All integumentary elements are within defined limits  Patient Coping  Patient Coping: Accepting  Accompanied by  Accompanied by: Alone    ECOG performance status and Distress Assessment      ECOG Performance:    ECOG Performance Status: 0    Distress " Assessment  Distress Assessment Score: No distress:     Pain Status  Currently in Pain: No/denies    Vital Signs     Vitals:    05/13/20 1415   BP: 133/82   Pulse: 75   Temp: 98.4  F (36.9  C)   SpO2: 96%       Physical Exam     GENERAL: No acute distress. Cooperative in conversation.   HEENT: Alopecia. Pupils are equal, round and reactive. Oral mucosa is clean and intact. No ulcerations or mucositis noted. No bleeding noted.  RESP:Chest symmetric lungs are clear bilaterally per auscultation. Regular respiratory rate. No wheezes or rhonchi.  CV: Normal S1 S2 Regular, rate and rhythm. No murmurs.  ABD: Nondistended, soft, nontender. Positive bowel sounds. No organomegaly.   EXTREMITIES: No lower extremity edema.   NEURO: Non- focal. Alert and oriented x3.  Cranial nerves appear intact.  PSYCH: Within normal limits. No depression or anxiety.  SKIN: Warm dry intact.    LYMPH NODES: Bilateral cervical, supraclavicular, axillary lymph node examination was done.  Negative for any palpable adenopathy.    Lab Results       Lab Results   Component Value Date    ALBUMIN 3.9 05/21/2019    ALT 20 05/21/2019    AST 19 05/21/2019    BUN 20 05/21/2019    CALCIUM 9.9 05/21/2019     05/21/2019    CHOL 279 (H) 08/30/2018    CO2 29 05/21/2019    CREATININE 1.01 05/21/2019    GFRAA >60 05/21/2019    GFRNONAA 55 (L) 05/21/2019    GLU 86 05/21/2019    HCT 40.0 05/21/2019    WBC 6.9 05/21/2019    HGB 13.3 05/21/2019     05/21/2019    K 3.9 05/21/2019     05/21/2019           Imaging Results     No results found.    Mammogram done in August 2019.  No evidence of any recurrence.    Gabriel Vega MD

## 2021-06-08 NOTE — PROGRESS NOTES
[facility] Hematology and Oncology Progress Note    Patient: Dionna Estrada  MRN: [id number]  Date of Service: 02/02/2017        Reason for Visit    Chief Complaint   Patient presents with     HE Cancer       Assessment and Plan  Cancer of upper-outer quadrant of female breast, right    Staging form: Breast, AJCC 7th Edition      Clinical stage from 7/10/2015: Stage IIA (T2, N0, cM0, Free text: ER+,CT-, [id number] 3+,) - Signed by Gabriel Vega MD on 7/23/2015      Pathologic: No stage assigned - Unsigned    1.  Stage IIc of breast right side HER-2 positive ER positive.  Status post lumpectomy with 6 sentinel lymph nodes removed. She has been treated with TCHP. She has had 6 cycles. Tolerated with expected side effects. She is also finished radiation therapy and finished a year of Herceptin maintenance. Exemestane started in February 2016.  She continues on that at this time.  I am going to stop that and start anastrozole.  I did tell her there are similar medications but could potentially have less side effects.  I will have a nurse give her a call her a month after she starts to see how she is feeling on the new medication.  SHe will return to the clinic in 3 months.  She is due for a mammogram in June 2017.    2. Osteopenia: currently on calcium and vitamin d. Continue that. Encourage her to get weight bearing exercise daily. Next DEXA at the beginning of 2018.     3. Arthralgia: likely from Exemestane. use OTC PRN. Encourage her to lose weight, which would help. Be active as she can. We are going to change aromatase inhibitors, which may help.     4. Mood changes: likely from exemestane. She states she can deal with this for now. Hopefully it will improve with the anastrozole instead of exemestane.     ECOG Performance   ECOG Performance Status: 1    Distress Assessment  Distress Assessment Score: 3 (Political )    Pain  Currently in Pain: No/denies  Pain Score (Initial OR Reassessment): No/Denies  "Pain      Problem List    1. Cancer of upper-outer quadrant of female breast, right  Comprehensive Metabolic Panel   2. Osteopenia     3. Arthralgia     4. Emotional lability        ______________________________________________________________________________    History of Present Illness    Dionna Estrada is a very pleasant 61 y.o. old female with a history of breast cancer located right side measuring 3.1 cm in size presenting as a lump that she detected couple of months before she presented to her physician and June 2015.  The biopsy confirmed this to be an invasive ductal carcinoma ER positive, MT negative and HER-2/abhijit 3+.  Subsequent that she underwent surgery on 9 July 2015 and she underwent right lumpectomy with sentinel lymph node biopsy.  That confirmed a 3.1 cm tumor with negative margin.  There was some DCIS present.  She had 3 sentinel lymph node removed per Dr. Triplett but in actually there were 6 total lymph nodes that were removed and all of them were negative.  Final stage T2 N0 M0 stage II.     She has started on TCHP protocol in August 2015 finished in December 2015. Tolerated it well. Some GERD and some diarrhea. She also finished radiation therapy in February 2016. Total dose of 6040 cGy delivered in 33 fractions.  When went on to finish Herceptin Maintenance.  Patient was then started on exemestane in March 2016.  She continues on that medication.  Her main complaint is that she feels very emotional and \"off\" the medication.  She feels that she is more irritable.  She feels more tired.  She also feels that she just doesn't think is clearly.  She also has complaints of stiffness joints in pain in her joints.  She does someTimes take over-the-counter medications.  She did stop that medication for a couple of weeks in the fall and she did feel quite a bit better but was worried about being off of it so has been taking it again    Pain Status  Currently in Pain: No/denies    Review of " Systems    Constitutional  Constitutional (WDL): Exceptions to WDL  Fatigue: Fatigue relieved by rest  Neurosensory  Neurosensory (WDL): Exceptions to WDL (joint aching and stiffness, vision changes, hearing loss)  Ataxia: Asymptomatic, clinical or diagnostic observations only, intervention not indicated (fell on ice, had mild concussion)  Peripheral Sensory Neuropathy: Asymptomatic, loss of deep tendon reflexes or paresthesia (thinks feet are kind of numb)  Cardiovascular  Cardiovascular (WDL): All cardiovascular elements are within defined limits  Pulmonary  Respiratory (WDL): Exceptions to WDL  Cough: Mild symptoms, nonprescription intervention indicated  Dyspnea: Shortness of breath with minimal exertion, limiting instrumental ADL  Gastrointestinal  Gastrointestinal (WDL): All gastrointestinal elements are within defined limits  Genitourinary  Genitourinary (WDL): All genitourinary elements are within defined limits  Integumentary  Integumentary (WDL): All integumentary elements are within defined limits  Patient Coping  Patient Coping: Accepting  Distress Assessment  Distress Assessment Score: 3 (Political )  Accompanied by  Accompanied by: Alone    Past History  Past Medical History:   Diagnosis Date     Breast cancer 2015    right      Clotting disorder     lower leg     Hx antineoplastic chemotherapy      Hx of radiation therapy      Shingles        Physical Exam    Recent Vitals 2/2/2017   Weight 230 lbs 2 oz   /65   Pulse 71   Temp 98.6   Temp src 1   SpO2 96       General: alert, appears stated age and cooperative  HEENT: Head: Normocephalic, no lesions, without obvious abnormality.  Pharynx: Dental Hygiene adequate. Normal buccal mucosa. Normal pharynx.  Chest: Normal chest wall and respirations. Clear to auscultation.  Cardiac: regular rate and rhythm, S1, S2 normal, no murmur, click, rub or gallop  Abdomen: abdomen is soft without significant tenderness, masses, organomegaly or  guarding  Extremities: normal strength, tone, and muscle mass  Skin: normal  CNS: normal without focal findings and mental status, speech normal, alert and oriented x3  Lymphatics: No abnormally enlarged lymph nodes.  Breast: Bilateral exam done today.  Patient does have large breasts.  No abnormal rashes or lesions noted no evidence for local recurrence.    Lab Results    Recent Results (from the past 168 hour(s))   Comprehensive Metabolic Panel   Result Value Ref Range    Sodium 140 136 - 145 mmol/L    Potassium 4.3 3.5 - 5.0 mmol/L    Chloride 104 98 - 107 mmol/L    CO2 31 22 - 31 mmol/L    Anion Gap, Calculation 5 5 - 18 mmol/L    Glucose 98 70 - 125 mg/dL    BUN 22 8 - 22 mg/dL    Creatinine 1.13 (H) 0.60 - 1.10 mg/dL    GFR MDRD Af Amer 59 (L) >60 mL/min/1.73m2    GFR MDRD Non Af Amer 49 (L) >60 mL/min/1.73m2    Bilirubin, Total 0.6 0.0 - 1.0 mg/dL    Calcium 9.8 8.5 - 10.5 mg/dL    Protein, Total 7.0 6.0 - 8.0 g/dL    Albumin 3.8 3.5 - 5.0 g/dL    Alkaline Phosphatase 96 45 - 120 U/L    AST 20 0 - 40 U/L    ALT 22 0 - 45 U/L   HM1 (CBC with Diff)   Result Value Ref Range    WBC 6.4 4.0 - 11.0 thou/uL    RBC 4.33 3.80 - 5.40 mill/uL    Hemoglobin 13.1 12.0 - 16.0 g/dL    Hematocrit 38.8 35.0 - 47.0 %    MCV 90 80 - 100 fL    MCH 30.3 27.0 - 34.0 pg    MCHC 33.8 32.0 - 36.0 g/dL    RDW 13.0 11.0 - 14.5 %    Platelets 223 140 - 440 thou/uL    MPV 9.8 7.0 - 10.0 fL    Neutrophils % 64 50 - 70 %    Lymphocytes % 23 20 - 40 %    Monocytes % 9 2 - 10 %    Eosinophils % 3 0 - 6 %    Basophils % 1 0 - 2 %    Neutrophils Absolute 4.1 2.0 - 7.7 thou/uL    Lymphocytes Absolute 1.5 0.8 - 4.4 thou/uL    Monocytes Absolute 0.6 0.0 - 0.9 thou/uL    Eosinophils Absolute 0.2 0.0 - 0.4 thou/uL    Basophils Absolute 0.0 0.0 - 0.2 thou/uL       Imaging    No results found.   Last mammogram was done on June 17, 2016      Signed by: Nilda Looney, CARISA

## 2021-06-09 NOTE — PROGRESS NOTES
"The patient has been notified of following:     \"This video visit will be conducted via a call between you and your physician/provider. We have found that certain health care needs can be provided without the need for an in-person physical exam.  This service lets us provide the care you need with a video conversation.  If a prescription is necessary we can send it directly to your pharmacy.  If lab work is needed we can place an order for that and you can then stop by our lab to have the test done at a later time.    Video visits are billed at different rates depending on your insurance coverage. Please reach out to your insurance provider with any questions.    If during the course of the call the physician/provider feels a video visit is not appropriate, you will not be charged for this service.\"    Patient has given verbal consent to a Video visit? Yes    Patient would like to receive their AVS by AVS Preference: Cheng.      Will anyone else be joining your video visit? No          Video-Visit Details    Video Start Time: 1211    Video End Time (time video stopped): 1223    Total visit Time: 13 minutes    Originating Location (pt. Location): [facility] Care Progress Note    Patient: Dionna Estrada  MRN: [id number]  Date of Service: 7/16/2020        Reason for visit      1. Malignant neoplasm of upper-outer quadrant of right breast in female, estrogen receptor positive (H)        Assessment     1.  Stage IIc of breast right side HER-2 positive ER positive.  Status post lumpectomy in 2015 with 6 sentinel lymph nodes removed.  She has been treated with TCHP. She has had 6 cycles. Tolerated with expected side effects.  She is also finished radiation therapy and Herceptin maintenance.  Started on exemestane in February 2016.  Switched to anastrozole in March 2017. She is tolerating it well.  No evidence of recurrence.  2.  Mild osteopenia seen on Bone density stable.  3.  Tolerating the " treatment well doing well overall.  4.  Feeling of a lump in her neck.  Really not visible at this time.    Plan     We did talk about the lump in the neck that she was worried about.  At this time is not very easily visible.  Advised that if she can see it still and it bothers her then she will need to come into the clinic for an in person exam.  Otherwise no change in her treatment plan which is enumerated below.    1.  Continue Arimidex.  She will continue that until February 2021.  2.  Good diet and exercise.  3.  RTC in 12 months.   4.  Mammogram yearly .  She will need a bone density next year.     Clinical stage      Cancer of upper-outer quadrant of female breast, right    Primary site: Breast (Right)    Staging method: AJCC 7th Edition    Clinical free text: ER+,KS-, [id number] 3+,    Clinical: Stage IIA (T2, N0, cM0) signed by Gabriel Vega MD on 7/23/2015  4:52 PM    Summary: Stage IIA (T2, N0, cM0)      History     Dionna Estrada is a very pleasant 65 y.o. old female with a history of  breast cancer located right side measuring 3.1 cm in size presenting as a lump that she detected in June 2015.  The biopsy confirmed this to be an invasive ductal carcinoma ER positive, KS negative and HER-2/abhijit 3+.  Subsequent that she underwent surgery on 9 July 2015 and she underwent right lumpectomy with sentinel lymph node biopsy.  That confirmed a 3.1 cm tumor with negative margin.  There was some DCIS present.  She had 3 sentinel lymph node removed and there were 6 total lymph nodes that were removed and all of them were negative.  Final stage T2 N0 M0 stage II.    She has started on TCHP protocol in August 2015 finished in December 2015. Tolerated it well. Some GERD and some diarrhea.  She also finished radiation therapy in February 2016.  Total dose of 6040 cGy delivered in 33 fractions.  Finished Herceptin Maintenance.  She was started on exemestane.    Her main complaint was that she feels very  "\"crabby\" being on Exemestane.  So in February 2017 her treatment was changed to anastrozole 1 mg p.o. daily.  She has been tolerating that quite well.  Her muscular skeletal symptoms are significantly better.  She is taking some supplements for that.     We have monitored her bone density and that has been stable.     We did a virtual visit today as the patient was wondering about the lump that she had seen on her neck.  She really cannot feel very well but she cannot see it in the mirror.  Therefore we did the visit over the phone.    Past Medical History     Past Medical History:   Diagnosis Date     Breast cancer (H) 2015    right      Clotting disorder (H)     lower leg     Hx antineoplastic chemotherapy 2015     Hx of radiation therapy 2015     Shingles        Review of Systems   Constitutional  Constitutional (WDL): All constitutional elements are within defined limits  Neurosensory  Neurosensory (WDL): All neurosensory elements are within defined limits  Cardiovascular  Cardiovascular (WDL): All cardiovascular elements are within defined limits  Pulmonary  Respiratory (WDL): Within Defined Limits  Gastrointestinal  Gastrointestinal (WDL): All gastrointestinal elements are within defined limits  Genitourinary  Genitourinary (WDL): All genitourinary elements are within defined limits  Integumentary  Integumentary (WDL): All integumentary elements are within defined limits  Patient Coping  Patient Coping: Accepting  Accompanied by       ECOG performance status and Distress Assessment      ECOG Performance:    ECOG Performance Status: 0    Distress Assessment  Distress Assessment Score: No distress:     Pain Status  Currently in Pain: No/denies    Vital Signs     There were no vitals filed for this visit.    Physical Exam     GENERAL: no acute distress. Cooperative in conversation.   HEENT: Facial symmetry preserved.  Oral mucosa is moist and intact.  NECK: No visible thyromegaly.  No deformity.  We did look " carefully on her neck.  There was no visible swelling.  RESP: Regular respiratory rate. No stridor.  No coughing.  EXTREMITIES: No visible upper extremity edema.   NEURO: non focal. Alert and oriented x3.  Cranial nerves II through XI appear intact clinically.  PSYCH: within normal limits.   SKIN: Facial skin appears warm dry intact       Lab Results       Lab Results   Component Value Date    ALBUMIN 3.9 05/21/2019    ALT 20 05/21/2019    AST 19 05/21/2019    BUN 20 05/21/2019    CALCIUM 9.9 05/21/2019     05/21/2019    CHOL 279 (H) 08/30/2018    CO2 29 05/21/2019    CREATININE 1.01 05/21/2019    GFRAA >60 05/21/2019    GFRNONAA 55 (L) 05/21/2019    GLU 86 05/21/2019    HCT 40.0 05/21/2019    WBC 6.9 05/21/2019    HGB 13.3 05/21/2019     05/21/2019    K 3.9 05/21/2019     05/21/2019           Imaging Results     No results found.    Mammogram done in August 2019.  No evidence of any recurrence.    Gabriel Vega MD

## 2021-06-09 NOTE — PROGRESS NOTES
"Dionna Estrada is a 65 y.o. female who is being evaluated via a billable telephone visit.      The patient has been notified of following:     \"This telephone visit will be conducted via a call between you and your physician/provider. We have found that certain health care needs can be provided without the need for a physical exam.  This service lets us provide the care you need with a short phone conversation.  If a prescription is necessary we can send it directly to your pharmacy.  If lab work is needed we can place an order for that and you can then stop by our lab to have the test done at a later time.    Telephone visits are billed at different rates depending on your insurance coverage. During this emergency period, for some insurers they may be billed the same as an in-person visit.  Please reach out to your insurance provider with any questions.    If during the course of the call the physician/provider feels a telephone visit is not appropriate, you will not be charged for this service.\"    Patient has given verbal consent to a Telephone visit? Yes    What phone number would you like to be contacted at? [phone]    Patient would like to receive their AVS by AVS Preference: Cheng.      Phone call duration: 10  minutes    Debra Bryan RN      "

## 2021-06-10 NOTE — PROGRESS NOTES
[facility] Cancer Care Progress Note    Patient: Dionna Estrada  MRN: [id number]  Date of Service: 5/25/2017        Reason for visit      1. Cancer of upper-outer quadrant of female breast, right    2. Osteopenia        Assessment     1.  Stage IIc of breast right side HER-2 positive ER positive.  Status post lumpectomy with 6 sentinel lymph nodes removed.  She has been treated with TCHP. She has had 6 cycles. Tolerated with expected side effects.  She is also finished radiation therapy and Herceptin maintenance.  Started on exemestane in February 2016.  Switched to anastrozole in March 2017.  2.  Mild Osteopenia seen on Bone density.  3.  otherwise tolerating the treatment well doing well overall.    Plan     1.  Continue Arimidex.  2.  Good diet and exercise.  3.  RTC in 6 months.   4.  Mammogram yearly in June.     Clinical stage      Cancer of upper-outer quadrant of female breast, right    Primary site: Breast (Right)    Staging method: AJCC 7th Edition    Clinical free text: ER+,VA-, [id number] 3+,    Clinical: Stage IIA (T2, N0, cM0) signed by Gabriel Vega MD on 7/23/2015  4:52 PM    Summary: Stage IIA (T2, N0, cM0)      History     Dionna Estrada is a very pleasant 62 y.o. old female with a history of  breast cancer located right side measuring 3.1 cm in size presenting as a lump that she detected couple of months before she presented to her physician and June 2015.  The biopsy confirmed this to be an invasive ductal carcinoma ER positive, VA negative and HER-2/abhijit 3+.  Subsequent that she underwent surgery on 9 July 2015 and she underwent right lumpectomy with sentinel lymph node biopsy.  That confirmed a 3.1 cm tumor with negative margin.  There was some DCIS present.  She had 3 sentinel lymph node removed per Dr. Triplett but in actually there were 6 total lymph nodes that were removed and all of them were negative.  Final stage T2 N0 M0 stage II.    She has started on TCHP protocol in  "August 2015 finished in December 2015. Tolerated it well. Some GERD and some diarrhea.  She also finished radiation therapy in February 2016.  Total dose of 6040 cGy delivered in 33 fractions.  Currently on Herceptin Maintenance.  Comes in today for her last treatment.     Her main complaint was that she feels very \"crabby\" being on Exemestane.  So in February 2000 617 her treatment was changed to anastrozole 1 mg p.o. daily.  She is tolerating that quite well.  Her muscular skeletal symptoms are significantly better.  She also feels her mood is better.  No other new symptoms reported.    Past Medical History     Past Medical History:   Diagnosis Date     Breast cancer 2015    right      Clotting disorder     lower leg     Hx antineoplastic chemotherapy      Hx of radiation therapy      Shingles        Review of Systems   Constitutional  Constitutional (WDL): All constitutional elements are within defined limits  Neurosensory  Neurosensory (WDL): Exceptions to WDL  Peripheral Sensory Neuropathy: Asymptomatic, loss of deep tendon reflexes or paresthesia (toes)  Cardiovascular  Cardiovascular (WDL): All cardiovascular elements are within defined limits  Pulmonary  Respiratory (WDL): Exceptions to WDL  Cough: Mild symptoms, nonprescription intervention indicated (recent cold)  Gastrointestinal  Gastrointestinal (WDL): All gastrointestinal elements are within defined limits  Genitourinary  Genitourinary (WDL): All genitourinary elements are within defined limits  Integumentary  Integumentary (WDL): All integumentary elements are within defined limits  Patient Coping  Patient Coping: Accepting  Accompanied by  Accompanied by: Family Member    ECOG performance status and Distress Assessment      ECOG Performance:    ECOG Performance Status: 0    Distress Assessment  Distress Assessment Score: 1:     Pain Status  Currently in Pain: No/denies    Vital Signs     Vitals:    05/25/17 1316   BP: 115/67   Pulse: 65   Temp: 98.1 "  F (36.7  C)   SpO2: 96%       Physical Exam     GENERAL: No acute distress. Cooperative in conversation.   HEENT: Alopecia. Pupils are equal, round and reactive. Oral mucosa is clean and intact. No ulcerations or mucositis noted. No bleeding noted.  RESP:Chest symmetric lungs are clear bilaterally per auscultation. Regular respiratory rate. No wheezes or rhonchi.  CV: Normal S1 S2 Regular, rate and rhythm. No murmurs.  ABD: Nondistended, soft, nontender. Positive bowel sounds. No organomegaly.   EXTREMITIES: No lower extremity edema.   NEURO: Non- focal. Alert and oriented x3.  Cranial nerves appear intact.  PSYCH: Within normal limits. No depression or anxiety.  SKIN: Warm dry intact.    LYMPH NODES: Bilateral cervical, supraclavicular, axillary lymph node examination was done.  Negative for any palpable adenopathy.    Lab Results     Results for orders placed or performed in visit on 05/25/17   Comprehensive Metabolic Panel   Result Value Ref Range    Sodium 136 136 - 145 mmol/L    Potassium 4.3 3.5 - 5.0 mmol/L    Chloride 100 98 - 107 mmol/L    CO2 27 22 - 31 mmol/L    Anion Gap, Calculation 9 5 - 18 mmol/L    Glucose 96 70 - 125 mg/dL    BUN 20 8 - 22 mg/dL    Creatinine 0.97 0.60 - 1.10 mg/dL    GFR MDRD Af Amer >60 >60 mL/min/1.73m2    GFR MDRD Non Af Amer 58 (L) >60 mL/min/1.73m2    Bilirubin, Total 0.7 0.0 - 1.0 mg/dL    Calcium 9.9 8.5 - 10.5 mg/dL    Protein, Total 6.9 6.0 - 8.0 g/dL    Albumin 3.8 3.5 - 5.0 g/dL    Alkaline Phosphatase 80 45 - 120 U/L    AST 24 0 - 40 U/L    ALT 36 0 - 45 U/L         Imaging Results     No results found.        Gabriel Vega MD

## 2021-06-11 NOTE — TELEPHONE ENCOUNTER
Spoke with pt regarding inbasket message.  Per Dr. Vega, have pt come into clinic in person for evaluation of lump.  Called pt to update her, and she would prefer to come into clinic.  Krystal will change appt tomorrow.  Per pt, Krystal only needs to call pt if she is changing appt time.  Pt verbalized understanding and appreciative of information.

## 2021-06-12 NOTE — PROGRESS NOTES
[facility] Cancer Care Progress Note    Patient: Dionna Estrada  MRN: [id number]  Date of Service: 10/8/2020        Reason for visit      1. Malignant neoplasm of upper-outer quadrant of right breast in female, estrogen receptor positive (H)        Assessment     1.  Stage IIc of breast right side HER-2 positive ER positive.  Status post lumpectomy in 2015 with 6 sentinel lymph nodes removed.  She has been treated with TCHP. She has had 6 cycles. Tolerated with expected side effects.  She is also finished radiation therapy and Herceptin maintenance.  Started on exemestane in February 2016.  Switched to anastrozole in March 2017. She is tolerating it well.  No evidence of recurrence.  2.  Mild osteopenia seen on Bone density stable.  3.   Noticing slight fullness on her neck.  This is baseline skin.  No lymphadenopathy noted.    Plan     1.  Continue Arimidex.  She will continue that until February 2021.  2.  Good diet and exercise.  3.  RTC in spring next year.   4.  Mammogram yearly .  She will need a bone density next year.     Clinical stage      Cancer of upper-outer quadrant of female breast, right    Primary site: Breast (Right)    Staging method: AJCC 7th Edition    Clinical free text: ER+,AK-, [id number] 3+,    Clinical: Stage IIA (T2, N0, cM0) signed by Gabriel Vega MD on 7/23/2015  4:52 PM    Summary: Stage IIA (T2, N0, cM0)      History     Dionna Estrada is a very pleasant 65 y.o. old female with a history of  breast cancer located right side measuring 3.1 cm in size presenting as a lump that she detected in June 2015.  The biopsy confirmed this to be an invasive ductal carcinoma ER positive, AK negative and HER-2/abhijit 3+.  Subsequent that she underwent surgery on 9 July 2015 and she underwent right lumpectomy with sentinel lymph node biopsy.  That confirmed a 3.1 cm tumor with negative margin.  There was some DCIS present.  She had 3 sentinel lymph node removed and there were 6 total  "lymph nodes that were removed and all of them were negative.  Final stage T2 N0 M0 stage II.    She has started on TCHP protocol in August 2015 finished in December 2015. Tolerated it well. Some GERD and some diarrhea.  She also finished radiation therapy in February 2016.  Total dose of 6040 cGy delivered in 33 fractions.  Finished Herceptin Maintenance.  She was started on exemestane.    Her main complaint was that she feels very \"crabby\" being on Exemestane.  So in February 2017 her treatment was changed to anastrozole 1 mg p.o. daily.  She has been tolerating that quite well.  Her muscular skeletal symptoms are significantly better.  She is taking some supplements for that.     We have monitored her bone density and that has been stable.  Past few weeks she has been noticing some fullness in her neck.  This is located about the thyroid area.  We did discuss with her on a video visit.  However could not clearly see it.  Therefore she comes in.  She did have an ultrasound done which was negative.    Past Medical History     Past Medical History:   Diagnosis Date     Breast cancer (H) 2015    right      Clotting disorder (H)     lower leg     Hx antineoplastic chemotherapy 2015     Hx of radiation therapy 2015     Shingles        Review of Systems   Constitutional  Constitutional (WDL): Exceptions to WDL  Fatigue: Fatigue relieved by rest(sometimes)  Neurosensory  Neurosensory (WDL): Exceptions to WDL  Ataxia: Asymptomatic, clinical or diagnostic observations only, intervention not indicated(Balance is off feels like she is on a boat)  Peripheral Sensory Neuropathy: Asymptomatic, loss of deep tendon reflexes or paresthesia(Neuropathy stable)  Cardiovascular  Cardiovascular (WDL): Exceptions to WDL  Edema: Yes  Edema Limbs: 5 - 10% inter-limb discrepancy in volume or circumference at point of greatest visible difference, swelling or obscuration of anatomic architecture on close inspection  Pulmonary  Respiratory " (WDL): Within Defined Limits  Gastrointestinal  Gastrointestinal (WDL): All gastrointestinal elements are within defined limits  Genitourinary  Genitourinary (WDL): All genitourinary elements are within defined limits  Integumentary  Integumentary (WDL): Exceptions to WDL(Mole on left upper arm, seeing Derm in Dec)  Pruritus: Mild or localized, topical intervention indicated(intermittent itching in various parts of body)  Patient Coping  Patient Coping: Accepting  Accompanied by  Accompanied by: Alone    ECOG performance status and Distress Assessment      ECOG Performance:    ECOG Performance Status: 1    Distress Assessment  Distress Assessment Score: No distress:     Pain Status  Currently in Pain: No/denies    Vital Signs     Vitals:    10/08/20 1445   BP: 130/81   Pulse: 87   Temp: 98.3  F (36.8  C)   SpO2: 97%       Physical Exam     GENERAL: No acute distress. Cooperative in conversation.   HEENT: Pupils are equal, round and reactive. Oral mucosa is clean and intact. No ulcerations or mucositis noted. No bleeding noted.  I really could not see any fullness in her neck area.  There appears to be slight thickness in the skin slightly towards the right side above the thyroid area.  RESP:Chest symmetric lungs are clear bilaterally per auscultation. Regular respiratory rate. No wheezes or rhonchi.  CV: Normal S1 S2 Regular, rate and rhythm. No murmurs.  ABD: Nondistended, soft, nontender. Positive bowel sounds. No organomegaly.   EXTREMITIES: No lower extremity edema.   NEURO: Non- focal. Alert and oriented x3.  Cranial nerves appear intact.  PSYCH: Within normal limits. No depression or anxiety.  SKIN: Warm dry intact.    LYMPH NODES: Bilateral cervical, supraclavicular, axillary lymph node examination was done.  Negative for any palpable adenopathy.    Lab Results       Lab Results   Component Value Date    ALBUMIN 4.0 08/05/2020    ALT 20 08/05/2020    AST 19 08/05/2020    BUN 16 08/05/2020    CALCIUM 10.3  08/05/2020     08/05/2020    CHOL 279 (H) 08/05/2020    CO2 24 08/05/2020    CREATININE 0.80 08/05/2020    GFRAA >60 08/05/2020    GFRNONAA >60 08/05/2020    GLU 95 08/05/2020    HCT 40.0 05/21/2019    WBC 6.9 05/21/2019    HGB 13.3 05/21/2019    MG 1.9 08/05/2020     05/21/2019    K 4.5 08/05/2020     08/05/2020           Imaging Results     Us Neck Limited    Result Date: 10/6/2020  EXAM: US NECK LIMITED LOCATION: [facility] DATE/TIME: 10/6/2020 1:57 PM INDICATION: Abnormal findings on diagnostic imaging of other specified body structures, history of right breast cancer COMPARISON: PET/CT 07/29/2015 port chest x-ray 05/21/2019 TECHNIQUE: Routine. FINDINGS: Targeted ultrasound of the upper right neck region of clinical concern was performed. No sonographically evident solid or cystic mass.     1.  No sonographically evident upper right neck mass in the region of clinical concern. If there is persistent concern for a right neck lesion, further evaluation with CT neck exam AP helpful.      Mammogram done in August 2019.  No evidence of any recurrence.    Gabriel Vega MD

## 2021-06-14 NOTE — PROGRESS NOTES
[facility] Hematology and Oncology Progress Note    Patient: Dionna Estrada  MRN: [id number]  Date of Service: 11/27/2017        Reason for Visit    Chief Complaint   Patient presents with     HE Cancer       Assessment and Plan  Cancer of upper-outer quadrant of female breast, right    Staging form: Breast, AJCC 7th Edition    - Clinical stage from 7/10/2015: Stage IIA (T2, N0, cM0, Free text: ER+,IL-, Gle5bzw 3+,) - Signed by Gabriel Vega MD on 7/23/2015    - Pathologic: No stage assigned - Unsigned    1.  Stage IIc of breast right side HER-2 positive ER positive.  Status post lumpectomy with 6 sentinel lymph nodes removed. She has been treated with TCHP. She has had 6 cycles. Tolerated with expected side effects. She is also finished radiation therapy and finished a year of Herceptin maintenance. Exemestane started in February 2016.  She switched to Arimidex in March 2017 due to intolerance to Exemestane and feels much better. She continues on arimidex.  She is due for a mammogram in June 2018. She will return in 6 months.      2. Osteopenia: currently on calcium and vitamin d. Continue that. Encourage her to get weight bearing exercise daily. Next DEXA in February 2018.      3. Arthralgia: likely from Arimidex. use OTC PRN. Encourage her to lose weight, which would help. Be active as she can.     ECOG Performance   ECOG Performance Status: 0     Distress Assessment  Distress Assessment Score: No distress    Pain  Currently in Pain: No/denies  Pain Score (Initial OR Reassessment): No/Denies Pain      Problem List    1. Malignant neoplasm of upper-outer quadrant of right breast in female, estrogen receptor positive  HM1(CBC and Differential)    Comprehensive Metabolic Panel   2. Osteopenia  DXA Bone Density Scan   3. Arthralgia          ______________________________________________________________________________    History of Present Illness    Dionna Estrada is a very pleasant 61 y.o. old  female with a history of breast cancer located right side measuring 3.1 cm in size presenting as a lump that she detected couple of months before she presented to her physician and June 2015.  The biopsy confirmed this to be an invasive ductal carcinoma ER positive, LA negative and HER-2/abhijit 3+.  Subsequent that she underwent surgery on 9 July 2015 and she underwent right lumpectomy with sentinel lymph node biopsy.  That confirmed a 3.1 cm tumor with negative margin.  There was some DCIS present.  She had 3 sentinel lymph node removed per Dr. Triplett but in actually there were 6 total lymph nodes that were removed and all of them were negative.  Final stage T2 N0 M0 stage II.      She has started on TCHP protocol in August 2015 finished in December 2015. Tolerated it well. She also finished radiation therapy in February 2016. Total dose of 6040 cGy delivered in 33 fractions.  When went on to finish Herceptin Maintenance.  Patient was then started on exemestane in March 2016.  She took that until February 2017, when she was switched to arimidex due to intolerance. Continues on that and only complaints are arthritis and balance issues. No falls.      Pain Status  Currently in Pain: No/denies    Review of Systems    Constitutional  Constitutional (WDL): Exceptions to WDL  Fatigue: Fatigue relieved by rest (increased with joint pain)  Neurosensory  Neurosensory (WDL): Exceptions to WDL  Ataxia: Asymptomatic, clinical or diagnostic observations only, intervention not indicated (with knees and ankle pain)  Peripheral Sensory Neuropathy: Asymptomatic, loss of deep tendon reflexes or paresthesia (Neuropathy in Feet, increased with cold)  Eye   Eye Disorder (WDL): Exceptions to WDL  Blurred Vision: Intervention not indicated (seeing eye doctor)  Ear  Ear Disorder (WDL): Exceptions to WDL  Tinnitus: Mild symptoms, intervention not indicated (since radiation therapy)  Cardiovascular  Cardiovascular (WDL): Exceptions to  WDL  Edema: Yes  Edema Limbs: 5 - 10% inter-limb discrepancy in volume or circumference at point of greatest visible difference, swelling or obscuration of anatomic architecture on close inspection  Pulmonary  Respiratory (WDL): Within Defined Limits  Gastrointestinal  Gastrointestinal (WDL): All gastrointestinal elements are within defined limits  Genitourinary  Genitourinary (WDL): All genitourinary elements are within defined limits  Lymphatic  Lymph (WDL): All lymph disorder elements are within defined limits  Musculoskeletal and Connective Tissue  Musculoskeletal and Connetive Tissue Disorders (WDL): Exceptions to WDL  Arthralgia: Mild pain (knees and ankles)  Myalgia: Mild pain (Muscle cramps in various places)  Integumentary  Integumentary (WDL): All integumentary elements are within defined limits  Patient Coping  Patient Coping: Accepting  Distress Assessment  Distress Assessment Score: No distress  Accompanied by  Accompanied by: Alone  Oral Chemo Adherence       Past History  Past Medical History:   Diagnosis Date     Breast cancer 2015    right      Clotting disorder     lower leg     Hx antineoplastic chemotherapy      Hx of radiation therapy      Shingles        PHYSICAL EXAM:  /78  Pulse 64  Temp 98.6  F (37  C) (Oral)   Wt (!) 228 lb 12.8 oz (103.8 kg)  SpO2 96%  BMI 36.93 kg/m2  GENERAL: no acute distress. Cooperative in conversation. Here alone  HEENT: pupils are equal, round and reactive. Oromucosa is clean and intact. No ulcerations or mucositis noted. No bleeding noted.  RESP: lungs are clear bilaterally per auscultation. Regular respiratory rate. No wheezes or rhonchi.  CV: Regular, rate and rhythm. No murmurs.  ABD: soft, nontender. Positive bowel sounds. No organomegaly.   MUSCULOSKELETAL: No lower extremity swelling.   NEURO: non focal. Alert and oriented x3.   PSYCH: within normal limits. No depression or anxiety.  SKIN: warm dry intact   LYMPH: no cervical, supraclavicular or  axillary lymphadenopathy  BREAST: Bilateral exam done.  Lumpectomy incision is well-healed.   No abnormal lesions or rashes noted.  No evidence for local recurrence bilaterally.      Lab Results    Recent Results (from the past 168 hour(s))   Comprehensive Metabolic Panel   Result Value Ref Range    Sodium 139 136 - 145 mmol/L    Potassium 4.1 3.5 - 5.0 mmol/L    Chloride 101 98 - 107 mmol/L    CO2 30 22 - 31 mmol/L    Anion Gap, Calculation 8 5 - 18 mmol/L    Glucose 95 70 - 125 mg/dL    BUN 22 8 - 22 mg/dL    Creatinine 1.10 0.60 - 1.10 mg/dL    GFR MDRD Af Amer >60 >60 mL/min/1.73m2    GFR MDRD Non Af Amer 50 (L) >60 mL/min/1.73m2    Bilirubin, Total 0.6 0.0 - 1.0 mg/dL    Calcium 10.0 8.5 - 10.5 mg/dL    Protein, Total 7.1 6.0 - 8.0 g/dL    Albumin 3.7 3.5 - 5.0 g/dL    Alkaline Phosphatase 86 45 - 120 U/L    AST 22 0 - 40 U/L    ALT 26 0 - 45 U/L   HM1 (CBC with Diff)   Result Value Ref Range    WBC 5.9 4.0 - 11.0 thou/uL    RBC 4.27 3.80 - 5.40 mill/uL    Hemoglobin 13.0 12.0 - 16.0 g/dL    Hematocrit 38.8 35.0 - 47.0 %    MCV 91 80 - 100 fL    MCH 30.4 27.0 - 34.0 pg    MCHC 33.5 32.0 - 36.0 g/dL    RDW 13.0 11.0 - 14.5 %    Platelets 227 140 - 440 thou/uL    MPV 11.8 8.5 - 12.5 fL    Neutrophils % 61 50 - 70 %    Lymphocytes % 26 20 - 40 %    Monocytes % 7 2 - 10 %    Eosinophils % 5 0 - 6 %    Basophils % 1 0 - 2 %    Neutrophils Absolute 3.6 2.0 - 7.7 thou/uL    Lymphocytes Absolute 1.6 0.8 - 4.4 thou/uL    Monocytes Absolute 0.4 0.0 - 0.9 thou/uL    Eosinophils Absolute 0.3 0.0 - 0.4 thou/uL    Basophils Absolute 0.0 0.0 - 0.2 thou/uL       Imaging    No results found.      Signed by: Nilda Looney, CNP

## 2021-06-17 NOTE — TELEPHONE ENCOUNTER
Patient calls in and leaves message on nurse triage line stating that she is due in May for her 5-year appointment which would be her last appointment in our clinic.  She states that starting Sunday, 4/25, her right breast has been inflamed and red.  She also had the chills and a neck ache.  She states that she is fully vaccinated for Covid and is not sure if this is breast cancer related issue or if she should be going to primary care.  I discussed this with Nilda Looney CNP in Dr Vega's absence.  She states that since patient is due to be seen in our clinic in May, we can either see her now for that visit or she can meet with her primary care provider.  Patient prefers to meet with us so I have transferred her through to the  to get her on the schedule.  She was appreciative of the call back.    Kiki Buenrostro RN

## 2021-06-17 NOTE — PROGRESS NOTES
[facility] Hematology and Oncology Progress Note    Patient: Dionna Estrada  MRN: [id number]  Date of Service: 04/28/2021        Reason for Visit    Chief Complaint   Patient presents with     HE Cancer     Malignant neoplasm of upper-outer quadrant of right breast in female, estrogen receptor positive        Assessment and Plan  Cancer Staging  Malignant neoplasm of upper-outer quadrant of right breast in female, estrogen receptor positive (H)  Staging form: Breast, AJCC 7th Edition  - Clinical stage from 7/10/2015: Stage IIA (T2, N0, cM0, Free text: ER+,LA-, Crd7dyl 3+,) - Signed by Gabriel Vega MD on 7/23/2015  - Pathologic: No stage assigned - Unsigned      1.  Stage IIc of breast right side HER-2 positive ER positive.  Status post lumpectomy with 6 sentinel lymph nodes removed. She has been treated with TCHP. She has had 6 cycles. Tolerated with expected side effects. She is also finished radiation therapy and finished a year of Herceptin maintenance. Exemestane started in February 2016.  She switched to Arimidex in March 2017 due to intolerance to Exemestane and did much better. She has a couple of days left of exemestane and then will be done. No need to follow up with us going forward. Encourage her to call us if she has any new concerns.      2. Osteopenia: currently on calcium and vitamin d. Continue that. Encourage her to get weight bearing exercise daily. Monitor DEXA scans per PCP. Last one was May 2020 and showed osteopenia.      3. Right breast cellulitis: already getting better on its own. No pain. No hot today. Still a little discolored. Will monitor and if gets worse or doesn't normalize, we will give some keflex. mammo in November was normal.     ECOG Performance   ECOG Performance Status: 0     Distress Assessment  Distress Assessment Score: No distress    Pain  Currently in Pain: No/denies      Problem List    1. Malignant neoplasm of upper-outer quadrant of right breast in female,  estrogen receptor positive (H)     2. Osteopenia, unspecified location     3. Mastitis chronic, right          ______________________________________________________________________________    History of Present Illness    Dionna Estrada is a very pleasant 66 y.o. female with a history of breast cancer located right side measuring 3.1 cm in size presenting as a lump that she detected couple of months before she presented to her physician and June 2015.  The biopsy confirmed this to be an invasive ductal carcinoma ER positive, OR negative and HER-2/abhijit 3+.  Subsequent that she underwent surgery on 9 July 2015 and she underwent right lumpectomy with sentinel lymph node biopsy.  That confirmed a 3.1 cm tumor with negative margin.  There was some DCIS present.  She had 3 sentinel lymph node removed per Dr. Triplett but in actually there were 6 total lymph nodes that were removed and all of them were negative.  Final stage T2 N0 M0 stage II.      She has started on TCHP protocol in August 2015 finished in December 2015. Tolerated it well. She also finished radiation therapy in February 2016. Total dose of 6040 cGy delivered in 33 fractions.  When went on to finish Herceptin Maintenance.  Patient was then started on exemestane in March 2016.  She took that until February 2017, when she was switched to arimidex due to intolerance. Did well with that. Can stop anytime now that she has been on for 5 years. states she has a couple of days left. Mild neuropathy.    Added on today due to right breast being reddened over the weekend. She felt tired and chilled on Sunday and noticed her breast was very red/warm. Has gotten better over the last 3 days. No longer warm.     Pain Status  Currently in Pain: No/denies    Review of Systems    Constitutional  Constitutional (WDL): Exceptions to WDL  Fatigue: Concerns(mild)  Chills: Concerns(episodes on 4/25)  Hot flashes/Night Sweats: Concerns  Neurosensory  Neurosensory (WDL):  "Exceptions to WDL  Peripheral Motor Neuropathy: Concerns(stable)  Peripheral Sensory Neuropathy: Concerns(stable)  Eye   Eye Disorder (WDL): All eye disorder elements are within defined limits(glasses)  Ear  Ear Disorder (WDL): Exceptions to WDL  Tinnitus: Concerns  Cardiovascular  Cardiovascular (WDL): All cardiovascular elements are within defined limits  Pulmonary  Respiratory (WDL): Within Defined Limits  Gastrointestinal  Gastrointestinal (WDL): All gastrointestinal elements are within defined limits  Genitourinary  Genitourinary (WDL): All genitourinary elements are within defined limits  Lymphatic  Lymph (WDL): Exceptions to WDL  Lymphedema: Concerns(right side of neck intermittently)  Lymph Node Discomfort: Concerns  Musculoskeletal and Connective Tissue  Musculoskeletal and Connetive Tissue Disorders (WDL): All Musculoskeletal and Connetive Tissue Disorder elements are within defined limits  Integumentary  Integumentary (WDL): Exceptions to WDL(purple/redness to right breast)  Patient Coping  Patient Coping: Accepting;Anxiety;Open/discussion  Accompanied by  Accompanied by: Alone  Oral Chemo Adherence         Past History  Past Medical History:   Diagnosis Date     Breast cancer (H) 2015    right      Clotting disorder (H)     lower leg     Hx antineoplastic chemotherapy 2015     Hx of radiation therapy 2015     Shingles        PHYSICAL EXAM  /74   Pulse 72   Ht 5' 6\" (1.676 m)   Wt (!) 223 lb 8 oz (101.4 kg)   LMP 09/24/1997   SpO2 96%   BMI 36.07 kg/m      GENERAL: no acute distress. Cooperative in conversation. Here alone due to visitor restrictions. Mask on  RESP: Regular respiratory rate. No expiratory wheezes   MUSCULOSKELETAL: no bilateral leg swelling  NEURO: non focal. Alert and oriented x3.   PSYCH: within normal limits. No depression or anxiety.  SKIN: exposed skin is dry intact.   BREAST: entire right breast is discolored, but not red or warm.     Lab Results    No results found for " this or any previous visit (from the past 168 hour(s)).    Imaging    No results found.      Signed by: Nilda Looney, CARISA

## 2021-06-17 NOTE — PROGRESS NOTES
"04/28/21 1:05 PM    Dionna Estrada is a 66 y.o. female who presents for:    Chief Complaint   Patient presents with     HE Cancer     Malignant neoplasm of upper-outer quadrant of right breast in female, estrogen receptor positive      Initial Vitals: /74   Pulse 72   Ht 5' 6\" (1.676 m)   Wt (!) 223 lb 8 oz (101.4 kg)   LMP 09/24/1997   SpO2 96%   BMI 36.07 kg/m       Estimated body mass index is 36.07 kg/m  as calculated from the following:    Height as of this encounter: 5' 6\" (1.676 m).    Weight as of this encounter: 223 lb 8 oz (101.4 kg).     Body surface area is 2.17 meters squared.      Allergies reviewed: Yes  Medications reviewed: Yes    Refills needed: No    Clinical concerns: concerns - purple/redness to right breast. Had chills and neck ache on 4/25.       Britt Lala CMA      "

## 2021-06-18 NOTE — PROGRESS NOTES
[facility] Cancer Care Progress Note    Patient: Dionna Estrada  MRN: [id number]  Date of Service: 5/17/2018        Reason for visit      1. Malignant neoplasm of upper-outer quadrant of right breast in female, estrogen receptor positive        Assessment     1.  Stage IIc of breast right side HER-2 positive ER positive.  Status post lumpectomy with 6 sentinel lymph nodes removed.  She has been treated with TCHP. She has had 6 cycles. Tolerated with expected side effects.  She is also finished radiation therapy and Herceptin maintenance.  Started on exemestane in February 2016.  Switched to anastrozole in March 2017. She is tolerating it well.  2.  Mild Osteopenia seen on Bone density stable.  3.  Tolerating the treatment well doing well overall.    Plan     1.  Continue Arimidex.  2.  Good diet and exercise.  3.  RTC in 6 months.   4.  Mammogram yearly in July.     Clinical stage      Cancer of upper-outer quadrant of female breast, right    Primary site: Breast (Right)    Staging method: AJCC 7th Edition    Clinical free text: ER+,NE-, Xbg5stb 3+,    Clinical: Stage IIA (T2, N0, cM0) signed by Gabriel Vega MD on 7/23/2015  4:52 PM    Summary: Stage IIA (T2, N0, cM0)      History     Dionna Estrada is a very pleasant 63 y.o. old female with a history of  breast cancer located right side measuring 3.1 cm in size presenting as a lump that she detected couple of months before she presented to her physician and June 2015.  The biopsy confirmed this to be an invasive ductal carcinoma ER positive, NE negative and HER-2/abhijit 3+.  Subsequent that she underwent surgery on 9 July 2015 and she underwent right lumpectomy with sentinel lymph node biopsy.  That confirmed a 3.1 cm tumor with negative margin.  There was some DCIS present.  She had 3 sentinel lymph node removed per Dr. Triplett but in actually there were 6 total lymph nodes that were removed and all of them were negative.  Final stage T2 N0 M0  "stage II.    She has started on TCHP protocol in August 2015 finished in December 2015. Tolerated it well. Some GERD and some diarrhea.  She also finished radiation therapy in February 2016.  Total dose of 6040 cGy delivered in 33 fractions.  Currently on Herceptin Maintenance.  Comes in today for her last treatment.     Her main complaint was that she feels very \"crabby\" being on Exemestane.  So in February 2000 617 her treatment was changed to anastrozole 1 mg p.o. daily.  She is tolerating that quite well.  Her muscular skeletal symptoms are significantly better.  She also feels her mood is better.  No other new symptoms reported. Had bone density done and her osteopenia is stable.    Past Medical History     Past Medical History:   Diagnosis Date     Breast cancer 2015    right      Clotting disorder     lower leg     Hx antineoplastic chemotherapy      Hx of radiation therapy      Shingles        Review of Systems   Constitutional  Constitutional (WDL): Exceptions to WDL  Fatigue: Fatigue relieved by rest  Neurosensory  Neurosensory (WDL): All neurosensory elements are within defined limits  Cardiovascular  Cardiovascular (WDL): All cardiovascular elements are within defined limits  Pulmonary  Respiratory (WDL): Within Defined Limits  Gastrointestinal  Gastrointestinal (WDL): All gastrointestinal elements are within defined limits  Genitourinary  Genitourinary (WDL): All genitourinary elements are within defined limits  Integumentary  Integumentary (WDL): All integumentary elements are within defined limits  Patient Coping  Patient Coping: Accepting  Accompanied by  Accompanied by: Alone    ECOG performance status and Distress Assessment      ECOG Performance:    ECOG Performance Status: 0    Distress Assessment  Distress Assessment Score: No distress:     Pain Status  Currently in Pain: No/denies    Vital Signs     Vitals:    05/17/18 1422   BP: 134/73   Pulse: 71   Temp: 99.1  F (37.3  C)   SpO2: 95% "       Physical Exam     GENERAL: No acute distress. Cooperative in conversation.   HEENT: Alopecia. Pupils are equal, round and reactive. Oral mucosa is clean and intact. No ulcerations or mucositis noted. No bleeding noted.  RESP:Chest symmetric lungs are clear bilaterally per auscultation. Regular respiratory rate. No wheezes or rhonchi.  CV: Normal S1 S2 Regular, rate and rhythm. No murmurs.  ABD: Nondistended, soft, nontender. Positive bowel sounds. No organomegaly.   EXTREMITIES: No lower extremity edema.   NEURO: Non- focal. Alert and oriented x3.  Cranial nerves appear intact.  PSYCH: Within normal limits. No depression or anxiety.  SKIN: Warm dry intact.    LYMPH NODES: Bilateral cervical, supraclavicular, axillary lymph node examination was done.  Negative for any palpable adenopathy.    Lab Results     Results for orders placed or performed in visit on 05/17/18   Comprehensive Metabolic Panel   Result Value Ref Range    Sodium 137 136 - 145 mmol/L    Potassium 4.2 3.5 - 5.0 mmol/L    Chloride 101 98 - 107 mmol/L    CO2 28 22 - 31 mmol/L    Anion Gap, Calculation 8 5 - 18 mmol/L    Glucose 113 70 - 125 mg/dL    BUN 23 (H) 8 - 22 mg/dL    Creatinine 0.86 0.60 - 1.10 mg/dL    GFR MDRD Af Amer >60 >60 mL/min/1.73m2    GFR MDRD Non Af Amer >60 >60 mL/min/1.73m2    Bilirubin, Total 0.6 0.0 - 1.0 mg/dL    Calcium 10.0 8.5 - 10.5 mg/dL    Protein, Total 7.1 6.0 - 8.0 g/dL    Albumin 3.6 3.5 - 5.0 g/dL    Alkaline Phosphatase 66 45 - 120 U/L    AST 19 0 - 40 U/L    ALT 22 0 - 45 U/L   HM1 (CBC with Diff)   Result Value Ref Range    WBC 6.0 4.0 - 11.0 thou/uL    RBC 4.29 3.80 - 5.40 mill/uL    Hemoglobin 12.7 12.0 - 16.0 g/dL    Hematocrit 38.3 35.0 - 47.0 %    MCV 89 80 - 100 fL    MCH 29.6 27.0 - 34.0 pg    MCHC 33.2 32.0 - 36.0 g/dL    RDW 13.0 11.0 - 14.5 %    Platelets 228 140 - 440 thou/uL    MPV 12.3 8.5 - 12.5 fL    Neutrophils % 62 50 - 70 %    Lymphocytes % 26 20 - 40 %    Monocytes % 9 2 - 10 %     Eosinophils % 3 0 - 6 %    Basophils % 0 0 - 2 %    Neutrophils Absolute 3.7 2.0 - 7.7 thou/uL    Lymphocytes Absolute 1.6 0.8 - 4.4 thou/uL    Monocytes Absolute 0.6 0.0 - 0.9 thou/uL    Eosinophils Absolute 0.2 0.0 - 0.4 thou/uL    Basophils Absolute 0.0 0.0 - 0.2 thou/uL         Imaging Results     No results found.        Gabriel Vega MD

## 2021-06-21 NOTE — PROGRESS NOTES
[facility] Progress Note    Patient: Dionna Estrada  MRN: [id number]  Date of Service: 11/15/2018        Reason for visit      1. Malignant neoplasm of upper-outer quadrant of right breast in female, estrogen receptor positive (H)        Assessment     1.  Stage IIc of breast right side HER-2 positive ER positive.  Status post lumpectomy with 6 sentinel lymph nodes removed.  She has been treated with TCHP. She has had 6 cycles. Tolerated with expected side effects.  She is also finished radiation therapy and Herceptin maintenance.  Started on exemestane in February 2016.  Switched to anastrozole in March 2017. She is tolerating it well.  No evidence of recurrence.  2.  Mild Osteopenia seen on Bone density stable.  3.  Tolerating the treatment well doing well overall.    Plan     1.  Continue Arimidex.  2.  Good diet and exercise.  3.  RTC in 6 months.   4.  Mammogram yearly in August.     Clinical stage      Cancer of upper-outer quadrant of female breast, right    Primary site: Breast (Right)    Staging method: AJCC 7th Edition    Clinical free text: ER+,NV-, Gdb5rit 3+,    Clinical: Stage IIA (T2, N0, cM0) signed by Gabriel Vega MD on 7/23/2015  4:52 PM    Summary: Stage IIA (T2, N0, cM0)      History     Dionna Estrada is a very pleasant 63 y.o. old female with a history of  breast cancer located right side measuring 3.1 cm in size presenting as a lump that she detected in June 2015.  The biopsy confirmed this to be an invasive ductal carcinoma ER positive, NV negative and HER-2/abhijit 3+.  Subsequent that she underwent surgery on 9 July 2015 and she underwent right lumpectomy with sentinel lymph node biopsy.  That confirmed a 3.1 cm tumor with negative margin.  There was some DCIS present.  She had 3 sentinel lymph node removed and there were 6 total lymph nodes that were removed and all of them were negative.  Final stage T2 N0 M0 stage II.    She has started on TCHP protocol in  "August 2015 finished in December 2015. Tolerated it well. Some GERD and some diarrhea.  She also finished radiation therapy in February 2016.  Total dose of 6040 cGy delivered in 33 fractions.  Currently on Herceptin Maintenance.  Comes in today for her last treatment.     Her main complaint was that she feels very \"crabby\" being on Exemestane.  So in February 2017 her treatment was changed to anastrozole 1 mg p.o. daily.  She is tolerating that quite well.  Her muscular skeletal symptoms are significantly better.  She is taking some supplements for that.  She also feels her mood is better.  No other new symptoms reported. Had bone density done and her osteopenia is stable.    Past Medical History     Past Medical History:   Diagnosis Date     Breast cancer (H) 2015    right      Clotting disorder (H)     lower leg     Hx antineoplastic chemotherapy      Hx of radiation therapy      Shingles        Review of Systems   Constitutional  Constitutional (WDL): All constitutional elements are within defined limits  Neurosensory  Neurosensory (WDL): Exceptions to WDL  Peripheral Sensory Neuropathy: Asymptomatic, loss of deep tendon reflexes or paresthesia(feet tingling)  Cardiovascular  Cardiovascular (WDL): All cardiovascular elements are within defined limits  Pulmonary  Respiratory (WDL): Within Defined Limits  Gastrointestinal  Gastrointestinal (WDL): All gastrointestinal elements are within defined limits  Genitourinary  Genitourinary (WDL): All genitourinary elements are within defined limits  Integumentary  Integumentary (WDL): All integumentary elements are within defined limits  Patient Coping  Patient Coping: Accepting  Accompanied by  Accompanied by: Alone    ECOG performance status and Distress Assessment      ECOG Performance:    ECOG Performance Status: 0    Distress Assessment  Distress Assessment Score: No distress:     Pain Status  Currently in Pain: No/denies    Vital Signs     Vitals:    11/15/18 1449 "   BP: 132/73   Pulse:    Temp:    SpO2:        Physical Exam     GENERAL: No acute distress. Cooperative in conversation.   HEENT: Alopecia. Pupils are equal, round and reactive. Oral mucosa is clean and intact. No ulcerations or mucositis noted. No bleeding noted.  RESP:Chest symmetric lungs are clear bilaterally per auscultation. Regular respiratory rate. No wheezes or rhonchi.  CV: Normal S1 S2 Regular, rate and rhythm. No murmurs.  ABD: Nondistended, soft, nontender. Positive bowel sounds. No organomegaly.   EXTREMITIES: No lower extremity edema.   NEURO: Non- focal. Alert and oriented x3.  Cranial nerves appear intact.  PSYCH: Within normal limits. No depression or anxiety.  SKIN: Warm dry intact.    LYMPH NODES: Bilateral cervical, supraclavicular, axillary lymph node examination was done.  Negative for any palpable adenopathy.    Lab Results     Results for orders placed or performed in visit on 11/15/18   Comprehensive Metabolic Panel   Result Value Ref Range    Sodium 139 136 - 145 mmol/L    Potassium 4.4 3.5 - 5.0 mmol/L    Chloride 103 98 - 107 mmol/L    CO2 30 22 - 31 mmol/L    Anion Gap, Calculation 6 5 - 18 mmol/L    Glucose 91 70 - 125 mg/dL    BUN 20 8 - 22 mg/dL    Creatinine 0.80 0.60 - 1.10 mg/dL    GFR MDRD Af Amer >60 >60 mL/min/1.73m2    GFR MDRD Non Af Amer >60 >60 mL/min/1.73m2    Bilirubin, Total 0.4 0.0 - 1.0 mg/dL    Calcium 9.9 8.5 - 10.5 mg/dL    Protein, Total 6.9 6.0 - 8.0 g/dL    Albumin 3.7 3.5 - 5.0 g/dL    Alkaline Phosphatase 70 45 - 120 U/L    AST 20 0 - 40 U/L    ALT 27 0 - 45 U/L   HM1 (CBC with Diff)   Result Value Ref Range    WBC 7.0 4.0 - 11.0 thou/uL    RBC 4.23 3.80 - 5.40 mill/uL    Hemoglobin 12.8 12.0 - 16.0 g/dL    Hematocrit 38.4 35.0 - 47.0 %    MCV 91 80 - 100 fL    MCH 30.3 27.0 - 34.0 pg    MCHC 33.3 32.0 - 36.0 g/dL    RDW 13.2 11.0 - 14.5 %    Platelets 234 140 - 440 thou/uL    MPV 12.1 8.5 - 12.5 fL    Neutrophils % 66 50 - 70 %    Lymphocytes % 22 20 - 40 %     Monocytes % 9 2 - 10 %    Eosinophils % 3 0 - 6 %    Basophils % 0 0 - 2 %    Neutrophils Absolute 4.6 2.0 - 7.7 thou/uL    Lymphocytes Absolute 1.5 0.8 - 4.4 thou/uL    Monocytes Absolute 0.6 0.0 - 0.9 thou/uL    Eosinophils Absolute 0.2 0.0 - 0.4 thou/uL    Basophils Absolute 0.0 0.0 - 0.2 thou/uL         Imaging Results     No results found.    Mammogram done in August 2018.  No evidence of any recurrence.    Gabriel Vega MD

## 2021-06-27 ENCOUNTER — HEALTH MAINTENANCE LETTER (OUTPATIENT)
Age: 66
End: 2021-06-27

## 2021-07-13 ENCOUNTER — RECORDS - HEALTHEAST (OUTPATIENT)
Dept: ADMINISTRATIVE | Facility: CLINIC | Age: 66
End: 2021-07-13

## 2021-07-21 ENCOUNTER — RECORDS - HEALTHEAST (OUTPATIENT)
Dept: ADMINISTRATIVE | Facility: CLINIC | Age: 66
End: 2021-07-21

## 2021-10-17 ENCOUNTER — HEALTH MAINTENANCE LETTER (OUTPATIENT)
Age: 66
End: 2021-10-17

## 2021-11-09 ENCOUNTER — ANCILLARY PROCEDURE (OUTPATIENT)
Dept: MAMMOGRAPHY | Facility: CLINIC | Age: 66
End: 2021-11-09
Attending: INTERNAL MEDICINE
Payer: COMMERCIAL

## 2021-11-09 DIAGNOSIS — Z12.31 VISIT FOR SCREENING MAMMOGRAM: ICD-10-CM

## 2021-11-09 PROCEDURE — 77063 BREAST TOMOSYNTHESIS BI: CPT

## 2022-06-20 ENCOUNTER — ANCILLARY PROCEDURE (OUTPATIENT)
Dept: MAMMOGRAPHY | Facility: CLINIC | Age: 67
End: 2022-06-20
Attending: PHYSICIAN ASSISTANT
Payer: COMMERCIAL

## 2022-06-20 DIAGNOSIS — L53.9 ERYTHEMA OF BREAST: ICD-10-CM

## 2022-06-20 DIAGNOSIS — R23.4 BREAST SKIN CHANGES: ICD-10-CM

## 2022-06-20 DIAGNOSIS — N64.4 BREAST TENDERNESS: ICD-10-CM

## 2022-06-20 DIAGNOSIS — N63.0 BREAST SWELLING: ICD-10-CM

## 2022-06-20 PROCEDURE — 76642 ULTRASOUND BREAST LIMITED: CPT | Mod: RT

## 2022-06-20 PROCEDURE — 77061 BREAST TOMOSYNTHESIS UNI: CPT | Mod: RT

## 2022-06-29 NOTE — PROGRESS NOTES
This is a 67 year old woman who comes in for  continued follow-up of her right breast cancer.  She is now 7 years  status post right  lumpectomy  with radiation.  She is feeling well today.  She comes in today because she had what clearly was another episode of cellulitis in her breast a couple weeks ago.  She got started on Keflex and immediately improved.  She is also had a mammogram recently that does show some thickening of the skin of the breast.      Please see the chart review for PMH, Meds, allergies, FH and SH.    ROS:  Pertinent items are noted in HPI.      Physical Exam:  There were no vitals taken for this visit.  General appearance: alert, appears stated age, cooperative and moderately obese  Breasts: There are no palpable masses. There are minimal radiation changes. The scar(s) has(ve) healed up well.  She does have some mild lymphedema of the breast.  No signs of infection today.  Lymph nodes: Cervical, supraclavicular, and axillary nodes normal.  Neurologic: Grossly normal    Data Review: Reviewed her current mammograms. No evidence of disease.      Impression: Personal History of breast cancer. NOD.  Clearly had an episode of cellulitis which I told her is something that can happen when people have some lymphedema.  The lymphedema is not so significant that I think she needs to be wearing her compression vest.  I told her she should just wear a sport bra.  I also think she should have antibiotics available for if an episode comes on again.  The sooner she gets started with antibiotics the better.    Plan: Follow up as needed.  Continue with yearly mammograms.  We will provide a prescription for Keflex for if and when another episode happens.

## 2022-06-30 ENCOUNTER — OFFICE VISIT (OUTPATIENT)
Dept: SURGERY | Facility: CLINIC | Age: 67
End: 2022-06-30
Attending: NURSE PRACTITIONER
Payer: COMMERCIAL

## 2022-06-30 VITALS — BODY MASS INDEX: 35.84 KG/M2 | RESPIRATION RATE: 16 BRPM | WEIGHT: 223 LBS | HEIGHT: 66 IN

## 2022-06-30 DIAGNOSIS — Z87.2 HISTORY OF CELLULITIS: ICD-10-CM

## 2022-06-30 DIAGNOSIS — Z85.3 PERSONAL HISTORY OF BREAST CANCER: Primary | ICD-10-CM

## 2022-06-30 DIAGNOSIS — I89.0 LYMPHEDEMA: ICD-10-CM

## 2022-06-30 PROCEDURE — G0463 HOSPITAL OUTPT CLINIC VISIT: HCPCS

## 2022-06-30 PROCEDURE — 99203 OFFICE O/P NEW LOW 30 MIN: CPT | Performed by: SPECIALIST

## 2022-06-30 RX ORDER — CEPHALEXIN 500 MG/1
500 CAPSULE ORAL 3 TIMES DAILY
Qty: 30 CAPSULE | Refills: 0 | Status: SHIPPED | OUTPATIENT
Start: 2022-06-30 | End: 2022-07-10

## 2022-06-30 NOTE — NURSING NOTE
"Tory presents to [facility] today for a surgical consult with Dr. Triplett  regarding an episode of cellulitis in her breast a few weeks ago.  She is s/p right breast lumpectomy followed by chemo and radiation in 2015.  She did take endocrine therapy and finished a year ago.  RN assessment and EMR update.  Resp 16   Ht 1.676 m (5' 6\")   Wt 101.2 kg (223 lb)   Breastfeeding No   BMI 35.99 kg/m   Patient met with Dr. Triplett .  See dictation for details of visit and follow up plan.  Invited calls.   RN time 15 mins.  "

## 2022-07-23 ENCOUNTER — HEALTH MAINTENANCE LETTER (OUTPATIENT)
Age: 67
End: 2022-07-23

## 2022-08-03 ENCOUNTER — LAB REQUISITION (OUTPATIENT)
Dept: LAB | Facility: CLINIC | Age: 67
End: 2022-08-03

## 2022-08-03 DIAGNOSIS — L29.9 PRURITUS, UNSPECIFIED: ICD-10-CM

## 2022-08-03 DIAGNOSIS — E78.5 HYPERLIPIDEMIA, UNSPECIFIED: ICD-10-CM

## 2022-08-03 LAB
ALBUMIN SERPL BCG-MCNC: 4.6 G/DL (ref 3.5–5.2)
ALP SERPL-CCNC: 76 U/L (ref 35–104)
ALT SERPL W P-5'-P-CCNC: 27 U/L (ref 10–35)
ANION GAP SERPL CALCULATED.3IONS-SCNC: 12 MMOL/L (ref 7–15)
AST SERPL W P-5'-P-CCNC: 27 U/L (ref 10–35)
BILIRUB SERPL-MCNC: 0.8 MG/DL
BUN SERPL-MCNC: 15.6 MG/DL (ref 8–23)
CALCIUM SERPL-MCNC: 10.1 MG/DL (ref 8.8–10.2)
CHLORIDE SERPL-SCNC: 100 MMOL/L (ref 98–107)
CHOLEST SERPL-MCNC: 285 MG/DL
CREAT SERPL-MCNC: 0.86 MG/DL (ref 0.51–0.95)
DEPRECATED HCO3 PLAS-SCNC: 27 MMOL/L (ref 22–29)
GFR SERPL CREATININE-BSD FRML MDRD: 73 ML/MIN/1.73M2
GLUCOSE SERPL-MCNC: 83 MG/DL (ref 70–99)
HDLC SERPL-MCNC: 88 MG/DL
LDLC SERPL CALC-MCNC: 179 MG/DL
NONHDLC SERPL-MCNC: 197 MG/DL
POTASSIUM SERPL-SCNC: 5 MMOL/L (ref 3.4–5.3)
PROT SERPL-MCNC: 6.8 G/DL (ref 6.4–8.3)
SODIUM SERPL-SCNC: 139 MMOL/L (ref 136–145)
TRIGL SERPL-MCNC: 90 MG/DL

## 2022-08-03 PROCEDURE — 80053 COMPREHEN METABOLIC PANEL: CPT | Performed by: NURSE PRACTITIONER

## 2022-08-03 PROCEDURE — 80061 LIPID PANEL: CPT | Performed by: NURSE PRACTITIONER

## 2022-10-01 ENCOUNTER — HEALTH MAINTENANCE LETTER (OUTPATIENT)
Age: 67
End: 2022-10-01

## 2022-12-21 ENCOUNTER — ANCILLARY PROCEDURE (OUTPATIENT)
Dept: MAMMOGRAPHY | Facility: CLINIC | Age: 67
End: 2022-12-21
Attending: OBSTETRICS & GYNECOLOGY
Payer: COMMERCIAL

## 2022-12-21 DIAGNOSIS — Z12.31 VISIT FOR SCREENING MAMMOGRAM: ICD-10-CM

## 2022-12-21 PROCEDURE — 77067 SCR MAMMO BI INCL CAD: CPT

## 2023-03-22 ENCOUNTER — LAB REQUISITION (OUTPATIENT)
Dept: LAB | Facility: CLINIC | Age: 68
End: 2023-03-22

## 2023-03-22 DIAGNOSIS — M79.662 PAIN IN LEFT LOWER LEG: ICD-10-CM

## 2023-03-22 LAB
ALBUMIN SERPL BCG-MCNC: 4.5 G/DL (ref 3.5–5.2)
ALP SERPL-CCNC: 70 U/L (ref 35–104)
ALT SERPL W P-5'-P-CCNC: 26 U/L (ref 10–35)
ANION GAP SERPL CALCULATED.3IONS-SCNC: 13 MMOL/L (ref 7–15)
AST SERPL W P-5'-P-CCNC: 24 U/L (ref 10–35)
BILIRUB SERPL-MCNC: 0.6 MG/DL
BUN SERPL-MCNC: 17.8 MG/DL (ref 8–23)
CALCIUM SERPL-MCNC: 10.5 MG/DL (ref 8.8–10.2)
CHLORIDE SERPL-SCNC: 102 MMOL/L (ref 98–107)
CREAT SERPL-MCNC: 0.87 MG/DL (ref 0.51–0.95)
CRP SERPL-MCNC: <3 MG/L
DEPRECATED HCO3 PLAS-SCNC: 25 MMOL/L (ref 22–29)
GFR SERPL CREATININE-BSD FRML MDRD: 72 ML/MIN/1.73M2
GLUCOSE SERPL-MCNC: 90 MG/DL (ref 70–99)
MAGNESIUM SERPL-MCNC: 2.5 MG/DL (ref 1.7–2.3)
POTASSIUM SERPL-SCNC: 5.5 MMOL/L (ref 3.4–5.3)
PROT SERPL-MCNC: 7 G/DL (ref 6.4–8.3)
SODIUM SERPL-SCNC: 140 MMOL/L (ref 136–145)

## 2023-03-22 PROCEDURE — 80053 COMPREHEN METABOLIC PANEL: CPT | Performed by: PHYSICIAN ASSISTANT

## 2023-03-22 PROCEDURE — 83735 ASSAY OF MAGNESIUM: CPT | Performed by: PHYSICIAN ASSISTANT

## 2023-03-22 PROCEDURE — 86140 C-REACTIVE PROTEIN: CPT | Performed by: PHYSICIAN ASSISTANT

## 2023-04-27 ENCOUNTER — LAB REQUISITION (OUTPATIENT)
Dept: LAB | Facility: CLINIC | Age: 68
End: 2023-04-27

## 2023-04-27 DIAGNOSIS — R03.0 ELEVATED BLOOD-PRESSURE READING, WITHOUT DIAGNOSIS OF HYPERTENSION: ICD-10-CM

## 2023-04-27 DIAGNOSIS — D68.9 COAGULATION DEFECT, UNSPECIFIED (H): ICD-10-CM

## 2023-04-27 LAB — D DIMER PPP FEU-MCNC: <0.27 UG/ML FEU (ref 0–0.5)

## 2023-04-27 PROCEDURE — 80053 COMPREHEN METABOLIC PANEL: CPT | Performed by: NURSE PRACTITIONER

## 2023-04-27 PROCEDURE — 85379 FIBRIN DEGRADATION QUANT: CPT | Performed by: NURSE PRACTITIONER

## 2023-04-28 LAB
ALBUMIN SERPL BCG-MCNC: 4.4 G/DL (ref 3.5–5.2)
ALP SERPL-CCNC: 70 U/L (ref 35–104)
ALT SERPL W P-5'-P-CCNC: 19 U/L (ref 10–35)
ANION GAP SERPL CALCULATED.3IONS-SCNC: 11 MMOL/L (ref 7–15)
AST SERPL W P-5'-P-CCNC: 19 U/L (ref 10–35)
BILIRUB SERPL-MCNC: 0.5 MG/DL
BUN SERPL-MCNC: 16.1 MG/DL (ref 8–23)
CALCIUM SERPL-MCNC: 9.8 MG/DL (ref 8.8–10.2)
CHLORIDE SERPL-SCNC: 102 MMOL/L (ref 98–107)
CREAT SERPL-MCNC: 0.97 MG/DL (ref 0.51–0.95)
DEPRECATED HCO3 PLAS-SCNC: 26 MMOL/L (ref 22–29)
GFR SERPL CREATININE-BSD FRML MDRD: 63 ML/MIN/1.73M2
GLUCOSE SERPL-MCNC: 130 MG/DL (ref 70–99)
POTASSIUM SERPL-SCNC: 4.7 MMOL/L (ref 3.4–5.3)
PROT SERPL-MCNC: 6.6 G/DL (ref 6.4–8.3)
SODIUM SERPL-SCNC: 139 MMOL/L (ref 136–145)

## 2023-05-30 ENCOUNTER — LAB REQUISITION (OUTPATIENT)
Dept: LAB | Facility: CLINIC | Age: 68
End: 2023-05-30

## 2023-05-30 DIAGNOSIS — D68.9 COAGULATION DEFECT, UNSPECIFIED (H): ICD-10-CM

## 2023-05-30 LAB — D DIMER PPP FEU-MCNC: 0.29 UG/ML FEU (ref 0–0.5)

## 2023-05-30 PROCEDURE — 85379 FIBRIN DEGRADATION QUANT: CPT | Performed by: NURSE PRACTITIONER

## 2023-08-12 ENCOUNTER — HEALTH MAINTENANCE LETTER (OUTPATIENT)
Age: 68
End: 2023-08-12

## 2024-01-02 ENCOUNTER — LAB REQUISITION (OUTPATIENT)
Dept: LAB | Facility: CLINIC | Age: 69
End: 2024-01-02

## 2024-01-02 DIAGNOSIS — E66.09 OTHER OBESITY DUE TO EXCESS CALORIES: ICD-10-CM

## 2024-01-02 PROCEDURE — 80048 BASIC METABOLIC PNL TOTAL CA: CPT | Performed by: NURSE PRACTITIONER

## 2024-01-03 LAB
ANION GAP SERPL CALCULATED.3IONS-SCNC: 11 MMOL/L (ref 7–15)
BUN SERPL-MCNC: 16.5 MG/DL (ref 8–23)
CALCIUM SERPL-MCNC: 10.1 MG/DL (ref 8.8–10.2)
CHLORIDE SERPL-SCNC: 101 MMOL/L (ref 98–107)
CREAT SERPL-MCNC: 0.88 MG/DL (ref 0.51–0.95)
DEPRECATED HCO3 PLAS-SCNC: 26 MMOL/L (ref 22–29)
EGFRCR SERPLBLD CKD-EPI 2021: 71 ML/MIN/1.73M2
GLUCOSE SERPL-MCNC: 101 MG/DL (ref 70–99)
POTASSIUM SERPL-SCNC: 5.3 MMOL/L (ref 3.4–5.3)
SODIUM SERPL-SCNC: 138 MMOL/L (ref 135–145)

## 2024-01-05 ENCOUNTER — ANCILLARY PROCEDURE (OUTPATIENT)
Dept: MAMMOGRAPHY | Facility: CLINIC | Age: 69
End: 2024-01-05
Attending: NURSE PRACTITIONER
Payer: COMMERCIAL

## 2024-01-05 DIAGNOSIS — Z12.31 VISIT FOR SCREENING MAMMOGRAM: ICD-10-CM

## 2024-01-05 PROCEDURE — 77063 BREAST TOMOSYNTHESIS BI: CPT

## 2024-09-05 ENCOUNTER — LAB REQUISITION (OUTPATIENT)
Dept: LAB | Facility: CLINIC | Age: 69
End: 2024-09-05

## 2024-09-05 DIAGNOSIS — Z13.220 ENCOUNTER FOR SCREENING FOR LIPOID DISORDERS: ICD-10-CM

## 2024-09-05 PROCEDURE — 80061 LIPID PANEL: CPT | Performed by: NURSE PRACTITIONER

## 2024-09-06 LAB
CHOLEST SERPL-MCNC: 273 MG/DL
FASTING STATUS PATIENT QL REPORTED: ABNORMAL
HDLC SERPL-MCNC: 89 MG/DL
LDLC SERPL CALC-MCNC: 166 MG/DL
NONHDLC SERPL-MCNC: 184 MG/DL
TRIGL SERPL-MCNC: 91 MG/DL

## 2024-09-29 ENCOUNTER — HEALTH MAINTENANCE LETTER (OUTPATIENT)
Age: 69
End: 2024-09-29

## 2025-01-28 ENCOUNTER — ANCILLARY PROCEDURE (OUTPATIENT)
Dept: MAMMOGRAPHY | Facility: CLINIC | Age: 70
End: 2025-01-28
Attending: NURSE PRACTITIONER
Payer: COMMERCIAL

## 2025-01-28 DIAGNOSIS — Z12.31 VISIT FOR SCREENING MAMMOGRAM: ICD-10-CM

## 2025-01-28 PROCEDURE — 77063 BREAST TOMOSYNTHESIS BI: CPT
